# Patient Record
Sex: MALE | Race: WHITE | Employment: FULL TIME | URBAN - METROPOLITAN AREA
[De-identification: names, ages, dates, MRNs, and addresses within clinical notes are randomized per-mention and may not be internally consistent; named-entity substitution may affect disease eponyms.]

---

## 2020-04-30 ENCOUNTER — APPOINTMENT (OUTPATIENT)
Dept: CT IMAGING | Age: 40
DRG: 263 | End: 2020-04-30
Payer: COMMERCIAL

## 2020-04-30 ENCOUNTER — APPOINTMENT (OUTPATIENT)
Dept: GENERAL RADIOLOGY | Age: 40
DRG: 263 | End: 2020-04-30
Payer: COMMERCIAL

## 2020-04-30 ENCOUNTER — HOSPITAL ENCOUNTER (INPATIENT)
Age: 40
LOS: 5 days | Discharge: HOME | DRG: 263 | End: 2020-05-05
Attending: EMERGENCY MEDICINE | Admitting: SURGERY
Payer: COMMERCIAL

## 2020-04-30 ENCOUNTER — APPOINTMENT (OUTPATIENT)
Dept: ULTRASOUND IMAGING | Age: 40
DRG: 263 | End: 2020-04-30
Payer: COMMERCIAL

## 2020-04-30 PROBLEM — K80.64 CHRONIC CHOLECYSTITIS DUE TO CHOLELITHIASIS WITH CHOLEDOCHOLITHIASIS: Status: ACTIVE | Noted: 2020-04-30

## 2020-04-30 LAB
ALBUMIN SERPL-MCNC: 4.3 G/DL (ref 3.5–5.2)
ALP BLD-CCNC: 144 U/L (ref 40–129)
ALT SERPL-CCNC: 329 U/L (ref 0–40)
ANION GAP SERPL CALCULATED.3IONS-SCNC: 13 MMOL/L (ref 7–16)
AST SERPL-CCNC: 113 U/L (ref 0–39)
BASOPHILS ABSOLUTE: 0.02 E9/L (ref 0–0.2)
BASOPHILS RELATIVE PERCENT: 0.2 % (ref 0–2)
BILIRUB SERPL-MCNC: 1.3 MG/DL (ref 0–1.2)
BILIRUBIN URINE: ABNORMAL
BLOOD, URINE: NEGATIVE
BUN BLDV-MCNC: 15 MG/DL (ref 6–20)
CALCIUM SERPL-MCNC: 9.6 MG/DL (ref 8.6–10.2)
CHLORIDE BLD-SCNC: 104 MMOL/L (ref 98–107)
CLARITY: CLEAR
CO2: 21 MMOL/L (ref 22–29)
COLOR: YELLOW
CREAT SERPL-MCNC: 0.8 MG/DL (ref 0.7–1.2)
EOSINOPHILS ABSOLUTE: 0.35 E9/L (ref 0.05–0.5)
EOSINOPHILS RELATIVE PERCENT: 3 % (ref 0–6)
GFR AFRICAN AMERICAN: >60
GFR NON-AFRICAN AMERICAN: >60 ML/MIN/1.73
GLUCOSE BLD-MCNC: 108 MG/DL (ref 74–99)
GLUCOSE URINE: NEGATIVE MG/DL
HCT VFR BLD CALC: 43.1 % (ref 37–54)
HEMOGLOBIN: 14.2 G/DL (ref 12.5–16.5)
IMMATURE GRANULOCYTES #: 0.04 E9/L
IMMATURE GRANULOCYTES %: 0.3 % (ref 0–5)
KETONES, URINE: NEGATIVE MG/DL
LACTIC ACID: 1.8 MMOL/L (ref 0.5–2.2)
LEUKOCYTE ESTERASE, URINE: NEGATIVE
LIPASE: 16 U/L (ref 13–60)
LYMPHOCYTES ABSOLUTE: 3.55 E9/L (ref 1.5–4)
LYMPHOCYTES RELATIVE PERCENT: 30.8 % (ref 20–42)
MCH RBC QN AUTO: 29.7 PG (ref 26–35)
MCHC RBC AUTO-ENTMCNC: 32.9 % (ref 32–34.5)
MCV RBC AUTO: 90.2 FL (ref 80–99.9)
MONOCYTES ABSOLUTE: 0.99 E9/L (ref 0.1–0.95)
MONOCYTES RELATIVE PERCENT: 8.6 % (ref 2–12)
NEUTROPHILS ABSOLUTE: 6.56 E9/L (ref 1.8–7.3)
NEUTROPHILS RELATIVE PERCENT: 57.1 % (ref 43–80)
NITRITE, URINE: NEGATIVE
PDW BLD-RTO: 14.5 FL (ref 11.5–15)
PH UA: 5.5 (ref 5–9)
PLATELET # BLD: 299 E9/L (ref 130–450)
PMV BLD AUTO: 11.1 FL (ref 7–12)
POTASSIUM REFLEX MAGNESIUM: 3.8 MMOL/L (ref 3.5–5)
PROTEIN UA: NEGATIVE MG/DL
RBC # BLD: 4.78 E12/L (ref 3.8–5.8)
SODIUM BLD-SCNC: 138 MMOL/L (ref 132–146)
SPECIFIC GRAVITY UA: >=1.03 (ref 1–1.03)
TOTAL PROTEIN: 7.4 G/DL (ref 6.4–8.3)
TROPONIN: <0.01 NG/ML (ref 0–0.03)
UROBILINOGEN, URINE: 4 E.U./DL
WBC # BLD: 11.5 E9/L (ref 4.5–11.5)

## 2020-04-30 PROCEDURE — 2580000003 HC RX 258: Performed by: TRANSPLANT SURGERY

## 2020-04-30 PROCEDURE — 83690 ASSAY OF LIPASE: CPT

## 2020-04-30 PROCEDURE — 84484 ASSAY OF TROPONIN QUANT: CPT

## 2020-04-30 PROCEDURE — 96375 TX/PRO/DX INJ NEW DRUG ADDON: CPT

## 2020-04-30 PROCEDURE — 2500000003 HC RX 250 WO HCPCS: Performed by: EMERGENCY MEDICINE

## 2020-04-30 PROCEDURE — 1200000000 HC SEMI PRIVATE

## 2020-04-30 PROCEDURE — 6360000002 HC RX W HCPCS: Performed by: EMERGENCY MEDICINE

## 2020-04-30 PROCEDURE — 99285 EMERGENCY DEPT VISIT HI MDM: CPT

## 2020-04-30 PROCEDURE — 6360000002 HC RX W HCPCS: Performed by: TRANSPLANT SURGERY

## 2020-04-30 PROCEDURE — 76705 ECHO EXAM OF ABDOMEN: CPT

## 2020-04-30 PROCEDURE — 71045 X-RAY EXAM CHEST 1 VIEW: CPT

## 2020-04-30 PROCEDURE — 96374 THER/PROPH/DIAG INJ IV PUSH: CPT

## 2020-04-30 PROCEDURE — 81003 URINALYSIS AUTO W/O SCOPE: CPT

## 2020-04-30 PROCEDURE — 74177 CT ABD & PELVIS W/CONTRAST: CPT

## 2020-04-30 PROCEDURE — 6370000000 HC RX 637 (ALT 250 FOR IP): Performed by: TRANSPLANT SURGERY

## 2020-04-30 PROCEDURE — 93005 ELECTROCARDIOGRAM TRACING: CPT | Performed by: EMERGENCY MEDICINE

## 2020-04-30 PROCEDURE — 2500000003 HC RX 250 WO HCPCS: Performed by: TRANSPLANT SURGERY

## 2020-04-30 PROCEDURE — 85025 COMPLETE CBC W/AUTO DIFF WBC: CPT

## 2020-04-30 PROCEDURE — 0FC98ZZ EXTIRPATION OF MATTER FROM COMMON BILE DUCT, VIA NATURAL OR ARTIFICIAL OPENING ENDOSCOPIC: ICD-10-PCS | Performed by: INTERNAL MEDICINE

## 2020-04-30 PROCEDURE — 83605 ASSAY OF LACTIC ACID: CPT

## 2020-04-30 PROCEDURE — 80053 COMPREHEN METABOLIC PANEL: CPT

## 2020-04-30 PROCEDURE — 6360000004 HC RX CONTRAST MEDICATION: Performed by: RADIOLOGY

## 2020-04-30 RX ORDER — CIPROFLOXACIN 2 MG/ML
400 INJECTION, SOLUTION INTRAVENOUS EVERY 12 HOURS
Status: DISCONTINUED | OUTPATIENT
Start: 2020-05-01 | End: 2020-05-05 | Stop reason: HOSPADM

## 2020-04-30 RX ORDER — SODIUM CHLORIDE 0.9 % (FLUSH) 0.9 %
10 SYRINGE (ML) INJECTION PRN
Status: DISCONTINUED | OUTPATIENT
Start: 2020-04-30 | End: 2020-05-05 | Stop reason: HOSPADM

## 2020-04-30 RX ORDER — ONDANSETRON 2 MG/ML
8 INJECTION INTRAMUSCULAR; INTRAVENOUS ONCE
Status: COMPLETED | OUTPATIENT
Start: 2020-04-30 | End: 2020-04-30

## 2020-04-30 RX ORDER — KETOROLAC TROMETHAMINE 30 MG/ML
15 INJECTION, SOLUTION INTRAMUSCULAR; INTRAVENOUS ONCE
Status: COMPLETED | OUTPATIENT
Start: 2020-04-30 | End: 2020-04-30

## 2020-04-30 RX ORDER — SODIUM CHLORIDE, SODIUM LACTATE, POTASSIUM CHLORIDE, CALCIUM CHLORIDE 600; 310; 30; 20 MG/100ML; MG/100ML; MG/100ML; MG/100ML
INJECTION, SOLUTION INTRAVENOUS CONTINUOUS
Status: DISCONTINUED | OUTPATIENT
Start: 2020-04-30 | End: 2020-05-05

## 2020-04-30 RX ORDER — SODIUM CHLORIDE 0.9 % (FLUSH) 0.9 %
10 SYRINGE (ML) INJECTION EVERY 12 HOURS SCHEDULED
Status: DISCONTINUED | OUTPATIENT
Start: 2020-04-30 | End: 2020-05-05 | Stop reason: HOSPADM

## 2020-04-30 RX ORDER — ONDANSETRON 2 MG/ML
4 INJECTION INTRAMUSCULAR; INTRAVENOUS EVERY 6 HOURS PRN
Status: DISCONTINUED | OUTPATIENT
Start: 2020-04-30 | End: 2020-05-05 | Stop reason: HOSPADM

## 2020-04-30 RX ORDER — MORPHINE SULFATE 4 MG/ML
8 INJECTION, SOLUTION INTRAMUSCULAR; INTRAVENOUS ONCE
Status: COMPLETED | OUTPATIENT
Start: 2020-04-30 | End: 2020-04-30

## 2020-04-30 RX ORDER — POTASSIUM CHLORIDE 20 MEQ/1
40 TABLET, EXTENDED RELEASE ORAL PRN
Status: DISCONTINUED | OUTPATIENT
Start: 2020-04-30 | End: 2020-05-05 | Stop reason: HOSPADM

## 2020-04-30 RX ORDER — ACETAMINOPHEN 325 MG/1
650 TABLET ORAL EVERY 6 HOURS
Status: DISCONTINUED | OUTPATIENT
Start: 2020-04-30 | End: 2020-05-05 | Stop reason: HOSPADM

## 2020-04-30 RX ORDER — KETOROLAC TROMETHAMINE 30 MG/ML
15 INJECTION, SOLUTION INTRAMUSCULAR; INTRAVENOUS EVERY 6 HOURS
Status: DISCONTINUED | OUTPATIENT
Start: 2020-04-30 | End: 2020-05-05 | Stop reason: HOSPADM

## 2020-04-30 RX ORDER — POTASSIUM CHLORIDE 7.45 MG/ML
10 INJECTION INTRAVENOUS PRN
Status: DISCONTINUED | OUTPATIENT
Start: 2020-04-30 | End: 2020-05-05 | Stop reason: HOSPADM

## 2020-04-30 RX ADMIN — FAMOTIDINE 20 MG: 10 INJECTION INTRAVENOUS at 17:05

## 2020-04-30 RX ADMIN — SODIUM CHLORIDE, POTASSIUM CHLORIDE, SODIUM LACTATE AND CALCIUM CHLORIDE: 600; 310; 30; 20 INJECTION, SOLUTION INTRAVENOUS at 21:32

## 2020-04-30 RX ADMIN — ACETAMINOPHEN 650 MG: 325 TABLET ORAL at 21:48

## 2020-04-30 RX ADMIN — FAMOTIDINE 20 MG: 10 INJECTION INTRAVENOUS at 21:47

## 2020-04-30 RX ADMIN — KETOROLAC TROMETHAMINE 15 MG: 30 INJECTION, SOLUTION INTRAMUSCULAR; INTRAVENOUS at 23:11

## 2020-04-30 RX ADMIN — HYDROMORPHONE HYDROCHLORIDE 0.5 MG: 1 INJECTION, SOLUTION INTRAMUSCULAR; INTRAVENOUS; SUBCUTANEOUS at 21:11

## 2020-04-30 RX ADMIN — MORPHINE SULFATE 8 MG: 4 INJECTION, SOLUTION INTRAMUSCULAR; INTRAVENOUS at 14:42

## 2020-04-30 RX ADMIN — IOPAMIDOL 110 ML: 755 INJECTION, SOLUTION INTRAVENOUS at 15:30

## 2020-04-30 RX ADMIN — METRONIDAZOLE 500 MG: 500 INJECTION, SOLUTION INTRAVENOUS at 21:48

## 2020-04-30 RX ADMIN — ONDANSETRON 8 MG: 2 INJECTION INTRAMUSCULAR; INTRAVENOUS at 14:42

## 2020-04-30 RX ADMIN — HYDROMORPHONE HYDROCHLORIDE 0.5 MG: 1 INJECTION, SOLUTION INTRAMUSCULAR; INTRAVENOUS; SUBCUTANEOUS at 17:06

## 2020-04-30 RX ADMIN — CIPROFLOXACIN 400 MG: 2 INJECTION, SOLUTION INTRAVENOUS at 23:12

## 2020-04-30 RX ADMIN — KETOROLAC TROMETHAMINE 15 MG: 30 INJECTION, SOLUTION INTRAMUSCULAR at 17:06

## 2020-04-30 ASSESSMENT — PAIN DESCRIPTION - PAIN TYPE
TYPE: ACUTE PAIN

## 2020-04-30 ASSESSMENT — ENCOUNTER SYMPTOMS
TROUBLE SWALLOWING: 0
HEMATOCHEZIA: 0
VOMITING: 0
COUGH: 0
ABDOMINAL PAIN: 1
CONSTIPATION: 0
ANAL BLEEDING: 0
SORE THROAT: 0
BLOOD IN STOOL: 0
CHEST TIGHTNESS: 0
COLOR CHANGE: 0
DIARRHEA: 0
EYE REDNESS: 0
EYE PAIN: 0
NAUSEA: 0
ABDOMINAL DISTENTION: 0
SHORTNESS OF BREATH: 0
HEMATEMESIS: 0

## 2020-04-30 ASSESSMENT — PAIN DESCRIPTION - DESCRIPTORS: DESCRIPTORS: SHARP;STABBING;SQUEEZING

## 2020-04-30 ASSESSMENT — PAIN SCALES - GENERAL
PAINLEVEL_OUTOF10: 1
PAINLEVEL_OUTOF10: 7
PAINLEVEL_OUTOF10: 10
PAINLEVEL_OUTOF10: 3

## 2020-04-30 ASSESSMENT — PAIN DESCRIPTION - LOCATION
LOCATION: BACK
LOCATION: CHEST
LOCATION: BACK

## 2020-04-30 ASSESSMENT — PAIN DESCRIPTION - FREQUENCY
FREQUENCY: INTERMITTENT
FREQUENCY: CONTINUOUS

## 2020-04-30 NOTE — ED PROVIDER NOTES
Patient is a 36year-old Male, with no significant past medical history, who presents via private vehicle from home for abdominal pain. The patient reports he has had chronic recurring episodes of sharp epigastric pain which come and go sporadically. He reports he has not had any issues with it about a year up until yesterday and had a recurrence that lasted for a few hours and spontaneously subsided. His pain returned today around 10:00 and has been persistent since. He describes it as sharp and stabbing and severe radiating straight through into his back. Nothing seems to make it better or worse. He has taken nothing for alleviation. He denies associated fevers or chills or sweats or nausea or vomiting or diarrhea. He reports being worked up for this pain in the past including with an EGD which no cause was identified. He reports that he follows with gastroenterology at [facility]. Denies alcohol use or drug use or any diet or medication changes. The history is provided by the patient.    Abdominal Pain   Pain location:  Epigastric  Pain quality: sharp and stabbing    Pain radiates to:  Back  Pain severity:  Severe  Onset quality:  Sudden  Timing:  Constant  Progression:  Unchanged  Chronicity:  Recurrent  Context: not alcohol use, not awakening from sleep, not diet changes, not eating, not laxative use, not medication withdrawal, not previous surgeries, not sick contacts, not suspicious food intake and not trauma    Relieved by:  Nothing  Worsened by:  Nothing  Ineffective treatments:  None tried  Associated symptoms: no anorexia, no chest pain, no chills, no constipation, no cough, no diarrhea, no dysuria, no fever, no hematemesis, no hematochezia, no hematuria, no melena, no nausea, no shortness of breath, no sore throat and no vomiting    Risk factors: obesity    Risk factors: no alcohol abuse, no aspirin use, not elderly, has not had multiple surgeries, no NSAID use and no recent recurrence for the past several years but with increased frequency over the past 2 days. He is found to have evidence of choledocholithiasis possible cholecystitis developing on CT scan, lab work. General surgery consulted they decided to admit the patient. Antibiotics ordered by surgical service.    --------------------------------------------- PAST HISTORY ---------------------------------------------  Past Medical History:  has a past medical history of Asthma. Past Surgical History:  has a past surgical history that includes Tonsillectomy. Social History:  reports that he has been smoking. He has never used smokeless tobacco. He reports that he does not drink alcohol or use drugs. Family History: family history is not on file. The patients home medications have been reviewed.     Allergies: Cefaclor    -------------------------------------------------- RESULTS -------------------------------------------------    LABS:  Results for orders placed or performed during the hospital encounter of 04/30/20   CBC Auto Differential   Result Value Ref Range    WBC 11.5 4.5 - 11.5 E9/L    RBC 4.78 3.80 - 5.80 E12/L    Hemoglobin 14.2 12.5 - 16.5 g/dL    Hematocrit 43.1 37.0 - 54.0 %    MCV 90.2 80.0 - 99.9 fL    MCH 29.7 26.0 - 35.0 pg    MCHC 32.9 32.0 - 34.5 %    RDW 14.5 11.5 - 15.0 fL    Platelets 652 352 - 320 E9/L    MPV 11.1 7.0 - 12.0 fL    Neutrophils % 57.1 43.0 - 80.0 %    Immature Granulocytes % 0.3 0.0 - 5.0 %    Lymphocytes % 30.8 20.0 - 42.0 %    Monocytes % 8.6 2.0 - 12.0 %    Eosinophils % 3.0 0.0 - 6.0 %    Basophils % 0.2 0.0 - 2.0 %    Neutrophils Absolute 6.56 1.80 - 7.30 E9/L    Immature Granulocytes # 0.04 E9/L    Lymphocytes Absolute 3.55 1.50 - 4.00 E9/L    Monocytes Absolute 0.99 (H) 0.10 - 0.95 E9/L    Eosinophils Absolute 0.35 0.05 - 0.50 E9/L    Basophils Absolute 0.02 0.00 - 0.20 E9/L   Comprehensive Metabolic Panel w/ Reflex to MG   Result Value Ref Range    Sodium 138 132 - 146 mmol/L    Potassium reflex Magnesium 3.8 3.5 - 5.0 mmol/L    Chloride 104 98 - 107 mmol/L    CO2 21 (L) 22 - 29 mmol/L    Anion Gap 13 7 - 16 mmol/L    Glucose 108 (H) 74 - 99 mg/dL    BUN 15 6 - 20 mg/dL    CREATININE 0.8 0.7 - 1.2 mg/dL    GFR Non-African American >60 >=60 mL/min/1.73    GFR African American >60     Calcium 9.6 8.6 - 10.2 mg/dL    Total Protein 7.4 6.4 - 8.3 g/dL    Alb 4.3 3.5 - 5.2 g/dL    Total Bilirubin 1.3 (H) 0.0 - 1.2 mg/dL    Alkaline Phosphatase 144 (H) 40 - 129 U/L     (H) 0 - 40 U/L     (H) 0 - 39 U/L   Lipase   Result Value Ref Range    Lipase 16 13 - 60 U/L   Troponin   Result Value Ref Range    Troponin <0.01 0.00 - 0.03 ng/mL   Lactic Acid, Plasma   Result Value Ref Range    Lactic Acid 1.8 0.5 - 2.2 mmol/L   Urinalysis, reflex to microscopic   Result Value Ref Range    Color, UA Yellow Straw/Yellow    Clarity, UA Clear Clear    Glucose, Ur Negative Negative mg/dL    Bilirubin Urine MODERATE (A) Negative    Ketones, Urine Negative Negative mg/dL    Specific Gravity, UA >=1.030 1.005 - 1.030    Blood, Urine Negative Negative    pH, UA 5.5 5.0 - 9.0    Protein, UA Negative Negative mg/dL    Urobilinogen, Urine 4.0 (A) <2.0 E.U./dL    Nitrite, Urine Negative Negative    Leukocyte Esterase, Urine Negative Negative   EKG 12 Lead   Result Value Ref Range    Ventricular Rate 55 BPM    Atrial Rate 55 BPM    P-R Interval 122 ms    QRS Duration 96 ms    Q-T Interval 436 ms    QTc Calculation (Bazett) 417 ms    P Axis 18 degrees    R Axis 39 degrees    T Axis 22 degrees       RADIOLOGY:  US ABDOMEN LIMITED   Final Result      Cholelithiasis with gallbladder wall thickening and slight dilation of   the common bile duct. Hepatic steatosis. CT ABDOMEN PELVIS W IV CONTRAST Additional Contrast? None   Final Result      There is no evidence to suggest acute process on CT of abdomen and   pelvis. Hepatic steatosis.       Bilateral pars defects L5-S1 with grade 1

## 2020-05-01 ENCOUNTER — APPOINTMENT (OUTPATIENT)
Dept: GENERAL RADIOLOGY | Age: 40
DRG: 263 | End: 2020-05-01
Payer: COMMERCIAL

## 2020-05-01 ENCOUNTER — APPOINTMENT (OUTPATIENT)
Dept: MRI IMAGING | Age: 40
DRG: 263 | End: 2020-05-01
Payer: COMMERCIAL

## 2020-05-01 ENCOUNTER — ANESTHESIA EVENT (OUTPATIENT)
Dept: ENDOSCOPY | Age: 40
DRG: 263 | End: 2020-05-01
Payer: COMMERCIAL

## 2020-05-01 ENCOUNTER — ANESTHESIA (OUTPATIENT)
Dept: ENDOSCOPY | Age: 40
DRG: 263 | End: 2020-05-01
Payer: COMMERCIAL

## 2020-05-01 VITALS — OXYGEN SATURATION: 93 % | SYSTOLIC BLOOD PRESSURE: 142 MMHG | DIASTOLIC BLOOD PRESSURE: 79 MMHG

## 2020-05-01 LAB
ALBUMIN SERPL-MCNC: 3.9 G/DL (ref 3.5–5.2)
ALP BLD-CCNC: 138 U/L (ref 40–129)
ALT SERPL-CCNC: 359 U/L (ref 0–40)
ANION GAP SERPL CALCULATED.3IONS-SCNC: 11 MMOL/L (ref 7–16)
APTT: 31.7 SEC (ref 24.5–35.1)
AST SERPL-CCNC: 160 U/L (ref 0–39)
BASOPHILS ABSOLUTE: 0.02 E9/L (ref 0–0.2)
BASOPHILS RELATIVE PERCENT: 0.2 % (ref 0–2)
BILIRUB SERPL-MCNC: 3.3 MG/DL (ref 0–1.2)
BUN BLDV-MCNC: 9 MG/DL (ref 6–20)
CALCIUM SERPL-MCNC: 9.3 MG/DL (ref 8.6–10.2)
CHLORIDE BLD-SCNC: 104 MMOL/L (ref 98–107)
CO2: 22 MMOL/L (ref 22–29)
CREAT SERPL-MCNC: 0.7 MG/DL (ref 0.7–1.2)
EKG ATRIAL RATE: 55 BPM
EKG P AXIS: 18 DEGREES
EKG P-R INTERVAL: 122 MS
EKG Q-T INTERVAL: 436 MS
EKG QRS DURATION: 96 MS
EKG QTC CALCULATION (BAZETT): 417 MS
EKG R AXIS: 39 DEGREES
EKG T AXIS: 22 DEGREES
EKG VENTRICULAR RATE: 55 BPM
EOSINOPHILS ABSOLUTE: 0.18 E9/L (ref 0.05–0.5)
EOSINOPHILS RELATIVE PERCENT: 1.4 % (ref 0–6)
GFR AFRICAN AMERICAN: >60
GFR NON-AFRICAN AMERICAN: >60 ML/MIN/1.73
GLUCOSE BLD-MCNC: 130 MG/DL (ref 74–99)
HCT VFR BLD CALC: 40.1 % (ref 37–54)
HEMOGLOBIN: 13.3 G/DL (ref 12.5–16.5)
IMMATURE GRANULOCYTES #: 0.05 E9/L
IMMATURE GRANULOCYTES %: 0.4 % (ref 0–5)
INR BLD: 0.9
LIPASE: >3000 U/L (ref 13–60)
LYMPHOCYTES ABSOLUTE: 2.64 E9/L (ref 1.5–4)
LYMPHOCYTES RELATIVE PERCENT: 20 % (ref 20–42)
MCH RBC QN AUTO: 30.2 PG (ref 26–35)
MCHC RBC AUTO-ENTMCNC: 33.2 % (ref 32–34.5)
MCV RBC AUTO: 91.1 FL (ref 80–99.9)
MONOCYTES ABSOLUTE: 1.37 E9/L (ref 0.1–0.95)
MONOCYTES RELATIVE PERCENT: 10.4 % (ref 2–12)
NEUTROPHILS ABSOLUTE: 8.97 E9/L (ref 1.8–7.3)
NEUTROPHILS RELATIVE PERCENT: 67.6 % (ref 43–80)
PDW BLD-RTO: 14.7 FL (ref 11.5–15)
PLATELET # BLD: 248 E9/L (ref 130–450)
PMV BLD AUTO: 11.1 FL (ref 7–12)
POTASSIUM REFLEX MAGNESIUM: 4.2 MMOL/L (ref 3.5–5)
PROTHROMBIN TIME: 10.3 SEC (ref 9.3–12.4)
RBC # BLD: 4.4 E12/L (ref 3.8–5.8)
SODIUM BLD-SCNC: 137 MMOL/L (ref 132–146)
TOTAL PROTEIN: 6.8 G/DL (ref 6.4–8.3)
WBC # BLD: 13.2 E9/L (ref 4.5–11.5)

## 2020-05-01 PROCEDURE — 6370000000 HC RX 637 (ALT 250 FOR IP): Performed by: TRANSPLANT SURGERY

## 2020-05-01 PROCEDURE — 2709999900 HC NON-CHARGEABLE SUPPLY: Performed by: INTERNAL MEDICINE

## 2020-05-01 PROCEDURE — 74330 X-RAY BILE/PANC ENDOSCOPY: CPT

## 2020-05-01 PROCEDURE — 2580000003 HC RX 258: Performed by: NURSE PRACTITIONER

## 2020-05-01 PROCEDURE — 6360000002 HC RX W HCPCS: Performed by: TRANSPLANT SURGERY

## 2020-05-01 PROCEDURE — 6360000004 HC RX CONTRAST MEDICATION: Performed by: RADIOLOGY

## 2020-05-01 PROCEDURE — 3700000001 HC ADD 15 MINUTES (ANESTHESIA): Performed by: INTERNAL MEDICINE

## 2020-05-01 PROCEDURE — 74183 MRI ABD W/O CNTR FLWD CNTR: CPT

## 2020-05-01 PROCEDURE — 93010 ELECTROCARDIOGRAM REPORT: CPT | Performed by: INTERNAL MEDICINE

## 2020-05-01 PROCEDURE — 7100000010 HC PHASE II RECOVERY - FIRST 15 MIN: Performed by: INTERNAL MEDICINE

## 2020-05-01 PROCEDURE — 6360000002 HC RX W HCPCS

## 2020-05-01 PROCEDURE — 2500000003 HC RX 250 WO HCPCS

## 2020-05-01 PROCEDURE — 2500000003 HC RX 250 WO HCPCS: Performed by: NURSE ANESTHETIST, CERTIFIED REGISTERED

## 2020-05-01 PROCEDURE — 3609015200 HC ERCP REMOVE CALCULI/DEBRIS BILIARY/PANCREAS DUCT: Performed by: INTERNAL MEDICINE

## 2020-05-01 PROCEDURE — 85025 COMPLETE CBC W/AUTO DIFF WBC: CPT

## 2020-05-01 PROCEDURE — 2580000003 HC RX 258: Performed by: INTERNAL MEDICINE

## 2020-05-01 PROCEDURE — 0FT44ZZ RESECTION OF GALLBLADDER, PERCUTANEOUS ENDOSCOPIC APPROACH: ICD-10-PCS | Performed by: SURGERY

## 2020-05-01 PROCEDURE — 36415 COLL VENOUS BLD VENIPUNCTURE: CPT

## 2020-05-01 PROCEDURE — 2580000003 HC RX 258: Performed by: TRANSPLANT SURGERY

## 2020-05-01 PROCEDURE — 2500000003 HC RX 250 WO HCPCS: Performed by: TRANSPLANT SURGERY

## 2020-05-01 PROCEDURE — 2580000003 HC RX 258: Performed by: NURSE ANESTHETIST, CERTIFIED REGISTERED

## 2020-05-01 PROCEDURE — 7100000011 HC PHASE II RECOVERY - ADDTL 15 MIN: Performed by: INTERNAL MEDICINE

## 2020-05-01 PROCEDURE — 85610 PROTHROMBIN TIME: CPT

## 2020-05-01 PROCEDURE — C1769 GUIDE WIRE: HCPCS | Performed by: INTERNAL MEDICINE

## 2020-05-01 PROCEDURE — BF131ZZ FLUOROSCOPY OF GALLBLADDER AND BILE DUCTS USING LOW OSMOLAR CONTRAST: ICD-10-PCS | Performed by: SURGERY

## 2020-05-01 PROCEDURE — 80053 COMPREHEN METABOLIC PANEL: CPT

## 2020-05-01 PROCEDURE — 6360000002 HC RX W HCPCS: Performed by: NURSE ANESTHETIST, CERTIFIED REGISTERED

## 2020-05-01 PROCEDURE — 99223 1ST HOSP IP/OBS HIGH 75: CPT | Performed by: SURGERY

## 2020-05-01 PROCEDURE — 83690 ASSAY OF LIPASE: CPT

## 2020-05-01 PROCEDURE — 3700000000 HC ANESTHESIA ATTENDED CARE: Performed by: INTERNAL MEDICINE

## 2020-05-01 PROCEDURE — 85730 THROMBOPLASTIN TIME PARTIAL: CPT

## 2020-05-01 PROCEDURE — 2720000010 HC SURG SUPPLY STERILE: Performed by: INTERNAL MEDICINE

## 2020-05-01 PROCEDURE — 1200000000 HC SEMI PRIVATE

## 2020-05-01 PROCEDURE — A9579 GAD-BASE MR CONTRAST NOS,1ML: HCPCS | Performed by: RADIOLOGY

## 2020-05-01 PROCEDURE — 6370000000 HC RX 637 (ALT 250 FOR IP): Performed by: NURSE PRACTITIONER

## 2020-05-01 RX ORDER — FENTANYL CITRATE 50 UG/ML
INJECTION, SOLUTION INTRAMUSCULAR; INTRAVENOUS PRN
Status: DISCONTINUED | OUTPATIENT
Start: 2020-05-01 | End: 2020-05-01 | Stop reason: SDUPTHER

## 2020-05-01 RX ORDER — SODIUM CHLORIDE, SODIUM LACTATE, POTASSIUM CHLORIDE, CALCIUM CHLORIDE 600; 310; 30; 20 MG/100ML; MG/100ML; MG/100ML; MG/100ML
1000 INJECTION, SOLUTION INTRAVENOUS CONTINUOUS
Status: ACTIVE | OUTPATIENT
Start: 2020-05-01 | End: 2020-05-01

## 2020-05-01 RX ORDER — SODIUM CHLORIDE, SODIUM LACTATE, POTASSIUM CHLORIDE, AND CALCIUM CHLORIDE .6; .31; .03; .02 G/100ML; G/100ML; G/100ML; G/100ML
1000 INJECTION, SOLUTION INTRAVENOUS ONCE
Status: COMPLETED | OUTPATIENT
Start: 2020-05-01 | End: 2020-05-01

## 2020-05-01 RX ORDER — PROPOFOL 10 MG/ML
INJECTION, EMULSION INTRAVENOUS PRN
Status: DISCONTINUED | OUTPATIENT
Start: 2020-05-01 | End: 2020-05-01 | Stop reason: SDUPTHER

## 2020-05-01 RX ORDER — MIDAZOLAM HYDROCHLORIDE 1 MG/ML
INJECTION INTRAMUSCULAR; INTRAVENOUS PRN
Status: DISCONTINUED | OUTPATIENT
Start: 2020-05-01 | End: 2020-05-01 | Stop reason: SDUPTHER

## 2020-05-01 RX ORDER — SODIUM CHLORIDE, SODIUM LACTATE, POTASSIUM CHLORIDE, CALCIUM CHLORIDE 600; 310; 30; 20 MG/100ML; MG/100ML; MG/100ML; MG/100ML
INJECTION, SOLUTION INTRAVENOUS CONTINUOUS PRN
Status: DISCONTINUED | OUTPATIENT
Start: 2020-05-01 | End: 2020-05-01 | Stop reason: SDUPTHER

## 2020-05-01 RX ORDER — LIDOCAINE HYDROCHLORIDE 20 MG/ML
INJECTION, SOLUTION EPIDURAL; INFILTRATION; INTRACAUDAL; PERINEURAL PRN
Status: DISCONTINUED | OUTPATIENT
Start: 2020-05-01 | End: 2020-05-01 | Stop reason: SDUPTHER

## 2020-05-01 RX ADMIN — KETOROLAC TROMETHAMINE 15 MG: 30 INJECTION, SOLUTION INTRAMUSCULAR; INTRAVENOUS at 09:50

## 2020-05-01 RX ADMIN — Medication 10 ML: at 20:27

## 2020-05-01 RX ADMIN — FENTANYL CITRATE 50 MCG: 50 INJECTION, SOLUTION INTRAMUSCULAR; INTRAVENOUS at 13:13

## 2020-05-01 RX ADMIN — PROPOFOL 50 MG: 10 INJECTION, EMULSION INTRAVENOUS at 13:17

## 2020-05-01 RX ADMIN — SODIUM CHLORIDE, POTASSIUM CHLORIDE, SODIUM LACTATE AND CALCIUM CHLORIDE 1000 ML: 600; 310; 30; 20 INJECTION, SOLUTION INTRAVENOUS at 14:30

## 2020-05-01 RX ADMIN — INDOMETHACIN 100 MG: 50 SUPPOSITORY RECTAL at 11:34

## 2020-05-01 RX ADMIN — GADOTERIDOL 20 ML: 279.3 INJECTION, SOLUTION INTRAVENOUS at 09:14

## 2020-05-01 RX ADMIN — FAMOTIDINE 20 MG: 10 INJECTION INTRAVENOUS at 20:25

## 2020-05-01 RX ADMIN — KETOROLAC TROMETHAMINE 15 MG: 30 INJECTION, SOLUTION INTRAMUSCULAR; INTRAVENOUS at 16:16

## 2020-05-01 RX ADMIN — ACETAMINOPHEN 650 MG: 325 TABLET ORAL at 03:58

## 2020-05-01 RX ADMIN — HYDROMORPHONE HYDROCHLORIDE 0.5 MG: 1 INJECTION, SOLUTION INTRAMUSCULAR; INTRAVENOUS; SUBCUTANEOUS at 06:15

## 2020-05-01 RX ADMIN — PROPOFOL 50 MG: 10 INJECTION, EMULSION INTRAVENOUS at 13:20

## 2020-05-01 RX ADMIN — IOPAMIDOL 14 ML: 612 INJECTION, SOLUTION INTRAVENOUS at 13:35

## 2020-05-01 RX ADMIN — LIDOCAINE HYDROCHLORIDE 40 MG: 20 INJECTION, SOLUTION EPIDURAL; INFILTRATION; INTRACAUDAL; PERINEURAL at 13:09

## 2020-05-01 RX ADMIN — KETOROLAC TROMETHAMINE 15 MG: 30 INJECTION, SOLUTION INTRAMUSCULAR; INTRAVENOUS at 03:58

## 2020-05-01 RX ADMIN — PROPOFOL 30 MG: 10 INJECTION, EMULSION INTRAVENOUS at 13:11

## 2020-05-01 RX ADMIN — METRONIDAZOLE 500 MG: 500 INJECTION, SOLUTION INTRAVENOUS at 16:16

## 2020-05-01 RX ADMIN — ACETAMINOPHEN 650 MG: 325 TABLET ORAL at 16:16

## 2020-05-01 RX ADMIN — PROPOFOL 100 MG: 10 INJECTION, EMULSION INTRAVENOUS at 13:25

## 2020-05-01 RX ADMIN — KETOROLAC TROMETHAMINE 15 MG: 30 INJECTION, SOLUTION INTRAMUSCULAR; INTRAVENOUS at 21:34

## 2020-05-01 RX ADMIN — SODIUM CHLORIDE, POTASSIUM CHLORIDE, SODIUM LACTATE AND CALCIUM CHLORIDE: 600; 310; 30; 20 INJECTION, SOLUTION INTRAVENOUS at 18:30

## 2020-05-01 RX ADMIN — SODIUM CHLORIDE, POTASSIUM CHLORIDE, SODIUM LACTATE AND CALCIUM CHLORIDE: 600; 310; 30; 20 INJECTION, SOLUTION INTRAVENOUS at 06:10

## 2020-05-01 RX ADMIN — ACETAMINOPHEN 650 MG: 325 TABLET ORAL at 21:33

## 2020-05-01 RX ADMIN — GLUCAGON HYDROCHLORIDE 0.25 MG: KIT at 13:20

## 2020-05-01 RX ADMIN — SODIUM CHLORIDE, POTASSIUM CHLORIDE, SODIUM LACTATE AND CALCIUM CHLORIDE 1000 ML: 600; 310; 30; 20 INJECTION, SOLUTION INTRAVENOUS at 11:32

## 2020-05-01 RX ADMIN — FENTANYL CITRATE 50 MCG: 50 INJECTION, SOLUTION INTRAMUSCULAR; INTRAVENOUS at 13:09

## 2020-05-01 RX ADMIN — HYDROMORPHONE HYDROCHLORIDE 0.25 MG: 1 INJECTION, SOLUTION INTRAMUSCULAR; INTRAVENOUS; SUBCUTANEOUS at 02:31

## 2020-05-01 RX ADMIN — CIPROFLOXACIN 400 MG: 2 INJECTION, SOLUTION INTRAVENOUS at 15:00

## 2020-05-01 RX ADMIN — SODIUM CHLORIDE, POTASSIUM CHLORIDE, SODIUM LACTATE AND CALCIUM CHLORIDE: 600; 310; 30; 20 INJECTION, SOLUTION INTRAVENOUS at 12:49

## 2020-05-01 RX ADMIN — METRONIDAZOLE 500 MG: 500 INJECTION, SOLUTION INTRAVENOUS at 06:12

## 2020-05-01 RX ADMIN — MIDAZOLAM 2 MG: 1 INJECTION INTRAMUSCULAR; INTRAVENOUS at 13:09

## 2020-05-01 RX ADMIN — PROPOFOL 120 MG: 10 INJECTION, EMULSION INTRAVENOUS at 13:10

## 2020-05-01 RX ADMIN — PROPOFOL 50 MG: 10 INJECTION, EMULSION INTRAVENOUS at 13:13

## 2020-05-01 RX ADMIN — PROPOFOL 50 MG: 10 INJECTION, EMULSION INTRAVENOUS at 13:15

## 2020-05-01 ASSESSMENT — ENCOUNTER SYMPTOMS
SORE THROAT: 0
VOMITING: 1
WHEEZING: 0
PHOTOPHOBIA: 0
SHORTNESS OF BREATH: 0
NAUSEA: 1
BACK PAIN: 0
DIARRHEA: 0
COUGH: 0
CONSTIPATION: 0
ABDOMINAL PAIN: 1
BLOOD IN STOOL: 0
EYE REDNESS: 0

## 2020-05-01 ASSESSMENT — PAIN SCALES - GENERAL
PAINLEVEL_OUTOF10: 3
PAINLEVEL_OUTOF10: 3
PAINLEVEL_OUTOF10: 5
PAINLEVEL_OUTOF10: 6
PAINLEVEL_OUTOF10: 3
PAINLEVEL_OUTOF10: 7
PAINLEVEL_OUTOF10: 0
PAINLEVEL_OUTOF10: 10
PAINLEVEL_OUTOF10: 8
PAINLEVEL_OUTOF10: 5
PAINLEVEL_OUTOF10: 7

## 2020-05-01 ASSESSMENT — PAIN - FUNCTIONAL ASSESSMENT
PAIN_FUNCTIONAL_ASSESSMENT: ACTIVITIES ARE NOT PREVENTED

## 2020-05-01 ASSESSMENT — PAIN DESCRIPTION - LOCATION
LOCATION: ABDOMEN
LOCATION: ABDOMEN;BACK
LOCATION: ABDOMEN;BACK

## 2020-05-01 ASSESSMENT — PAIN DESCRIPTION - ONSET
ONSET: ON-GOING

## 2020-05-01 ASSESSMENT — PAIN DESCRIPTION - DESCRIPTORS
DESCRIPTORS: SHARP;STABBING
DESCRIPTORS: SHARP
DESCRIPTORS: SHARP;STABBING

## 2020-05-01 ASSESSMENT — PAIN DESCRIPTION - PAIN TYPE
TYPE: ACUTE PAIN

## 2020-05-01 ASSESSMENT — PAIN DESCRIPTION - FREQUENCY
FREQUENCY: INTERMITTENT
FREQUENCY: CONTINUOUS
FREQUENCY: CONTINUOUS

## 2020-05-01 ASSESSMENT — PAIN DESCRIPTION - PROGRESSION
CLINICAL_PROGRESSION: NOT CHANGED
CLINICAL_PROGRESSION: GRADUALLY IMPROVING
CLINICAL_PROGRESSION: GRADUALLY IMPROVING
CLINICAL_PROGRESSION: GRADUALLY WORSENING

## 2020-05-01 ASSESSMENT — PAIN DESCRIPTION - ORIENTATION
ORIENTATION: MID
ORIENTATION: LEFT

## 2020-05-01 ASSESSMENT — LIFESTYLE VARIABLES: SMOKING_STATUS: 1

## 2020-05-01 NOTE — ANESTHESIA PRE PROCEDURE
Department of Anesthesiology  Preprocedure Note       Name:  Jing Pulido   Age:  36 y.o.  :  1980                                          MRN:  [id number]         Date:  2020      Surgeon: Gita Rausch):  Cierra Renee MD    Procedure: ERCP ENDOSCOPIC RETROGRADE CHOLANGIOPANCREATOGRAPHY (N/A )    Medications prior to admission:   Prior to Admission medications    Not on File       Current medications:    Current Facility-Administered Medications   Medication Dose Route Frequency Provider Last Rate Last Dose    indomethacin (INDOCIN) 50 MG suppository 100 mg  100 mg Rectal See Admin Instructions Radhika A Sugar, APRN - CNP        lactated ringers bolus  1,000 mL Intravenous Once Radhika A Sugar, APRN -  mL/hr at 20 1132 1,000 mL at 20 1132    sodium chloride flush 0.9 % injection 10 mL  10 mL Intravenous 2 times per day Eddi Barakat III, MD        sodium chloride flush 0.9 % injection 10 mL  10 mL Intravenous PRN Eddi Barakat III, MD        enoxaparin (LOVENOX) injection 40 mg  40 mg Subcutaneous Daily Eddi Barakat III, MD        lactated ringers infusion   Intravenous Continuous Eddi Barakat III,  mL/hr at 20 0610      potassium chloride (KLOR-CON M) extended release tablet 40 mEq  40 mEq Oral PRN Eddi Barakat III, MD        Or    potassium bicarb-citric acid (EFFER-K) effervescent tablet 40 mEq  40 mEq Oral PRN Eddi Barakat III, MD        Or    potassium chloride 10 mEq/100 mL IVPB (Peripheral Line)  10 mEq Intravenous PRN Eddi Barakat III, MD        ciprofloxacin (CIPRO) IVPB 400 mg  400 mg Intravenous Q12H Eddi Barakat III, MD   Stopped at 20 0020    metronidazole (FLAGYL) 500 mg in NaCl 100 mL IVPB premix  500 mg Intravenous Q8H Eddi Barakat III, MD   Stopped at 20 0726    HYDROmorphone (DILAUDID) injection 0.25 mg  0.25 mg Intravenous Q3H PRN Kanu Novak III, MD   0.25 mg at 05/01/20 0231    Or    HYDROmorphone (DILAUDID) injection 0.5 mg  0.5 mg Intravenous Q3H PRN Derek Hopson III, MD   0.5 mg at 05/01/20 0615    acetaminophen (TYLENOL) tablet 650 mg  650 mg Oral Q6H Kanu Novak III, MD   650 mg at 05/01/20 0358    ondansetron (ZOFRAN) injection 4 mg  4 mg Intravenous Q6H PRN Derek Hopson III, MD        famotidine (PEPCID) injection 20 mg  20 mg Intravenous BID Kanu Novak III, MD   20 mg at 04/30/20 2147    ketorolac (TORADOL) injection 15 mg  15 mg Intravenous Q6H Kanu Novak III, MD   15 mg at 05/01/20 0950    HYDROmorphone (DILAUDID) injection 0.5 mg  0.5 mg Intravenous Once Bentley Agosto DO           Allergies: Allergies   Allergen Reactions    Cefaclor Hives       Problem List:    Patient Active Problem List   Diagnosis Code    Chronic cholecystitis due to cholelithiasis with choledocholithiasis K80.64       Past Medical History:        Diagnosis Date    Asthma        Past Surgical History:        Procedure Laterality Date    TONSILLECTOMY         Social History:    Social History     Tobacco Use    Smoking status: Current Every Day Smoker    Smokeless tobacco: Never Used   Substance Use Topics    Alcohol use:  No                                Ready to quit: Not Answered  Counseling given: Not Answered      Vital Signs (Current):   Vitals:    04/30/20 2114 04/30/20 2115 05/01/20 0812 05/01/20 0945   BP: (!) 168/86 (!) 154/97 (!) 157/88 (!) 163/91   Pulse: 77 (!) 49 82 82   Resp: 20 20 18    Temp: 97.5 °F (36.4 °C) 97.8 °F (36.6 °C) 98.6 °F (37 °C)    TempSrc: Oral Oral Oral    SpO2: 99% 94% 95%    Weight:       Height:                                                  BP Readings from Last 3 Encounters:   05/01/20 (!) 163/91   09/14/16 125/66       NPO Status:                                                   Date of last liquid consumption: 04/30/20                        Date of last solid food consumption: 04/30/20    BMI:   Wt Readings from Last 3 Encounters:   04/30/20 255 lb (115.7 kg)   09/14/16 215 lb (97.5 kg)     Body mass index is 35.57 kg/m². CBC:   Lab Results   Component Value Date    WBC 13.2 05/01/2020    RBC 4.40 05/01/2020    RBC 4.99 04/12/2017    HGB 13.3 05/01/2020    HCT 40.1 05/01/2020    MCV 91.1 05/01/2020    RDW 14.7 05/01/2020     05/01/2020       CMP:   Lab Results   Component Value Date     05/01/2020    K 4.2 05/01/2020     05/01/2020    CO2 22 05/01/2020    BUN 9 05/01/2020    CREATININE 0.7 05/01/2020    GFRAA >60 05/01/2020    LABGLOM >60 05/01/2020    GLUCOSE 130 05/01/2020    PROT 6.8 05/01/2020    CALCIUM 9.3 05/01/2020    BILITOT 3.3 05/01/2020    ALKPHOS 138 05/01/2020     05/01/2020     05/01/2020       POC Tests: No results for input(s): POCGLU, POCNA, POCK, POCCL, POCBUN, POCHEMO, POCHCT in the last 72 hours.     Coags: No results found for: PROTIME, INR, APTT    HCG (If Applicable): No results found for: PREGTESTUR, PREGSERUM, HCG, HCGQUANT     ABGs: No results found for: PHART, PO2ART, SGM6VVW, GLU7YDD, BEART, W8YOHHKP     Type & Screen (If Applicable):  No results found for: [facility]    Anesthesia Evaluation  Patient summary reviewed no history of anesthetic complications:   Airway: Mallampati: III  TM distance: >3 FB   Neck ROM: full  Mouth opening: > = 3 FB Dental:          Pulmonary: breath sounds clear to auscultation  (+) asthma: current smoker                          ROS comment: CXR 4/30/2020:  Limited study, due to a poor inspiratory effort, no gross  abnormality       Cardiovascular:  Exercise tolerance: good (>4 METS),         ECG reviewed  Rhythm: regular  Rate: normal                 ROS comment: EKG 4/30/2020:  Sinus bradycardia  Otherwise normal ECG  No previous ECGs available  Confirmed by Laura Davidson ([id number]) on 5/1/2020 8:15:33 AM     Neuro/Psych:

## 2020-05-01 NOTE — PATIENT CARE CONFERENCE
P Quality Flow/Interdisciplinary Rounds Progress Note        Quality Flow Rounds held on May 1, 2020    Disciplines Attending:  Bedside Nurse, ,  and Nursing Unit Leadership    Mimi Abraham was admitted on 4/30/2020  2:10 PM    Anticipated Discharge Date:  Expected Discharge Date: 05/02/20    Disposition:    Melecio Score:  Melecio Scale Score: 22    Readmission Score:         Discussed patient goal for the day, patient clinical progression, and barriers to discharge.   The following Goal(s) of the Day/Commitment(s) have been identified:  [facility]  May 1, 2020

## 2020-05-01 NOTE — H&P
History and Physical    Patient's Name/Date of Birth: Zac Mcnair / 1980 (3 y.o.)    Date: May 1, 2020     Chief Complaint:   Chief Complaint   Patient presents with    Abdominal Pain     epigastric pain radiating straight thru to back- ongoing issue for years         HPI: Patient is a 3year-old male presents with complaints of severe upper abdominal pain. This began yesterday. It was accompanied initially with nausea. He had one episode of emesis this morning. He reports he has had intermittent pains for the last year or 2 in the same area. He is uncertain whether these are brought on by eating a. He was noted to have gallstones. He had initially mild elevation of bilirubin and normal lipase. However, lipase is significantly elevated on this morning's labs. Bilirubin has also increased. MRCP has been ordered and is pending. He has never had any abdominal surgery before. Past Medical History:   Diagnosis Date    Asthma        Past Surgical History:   Procedure Laterality Date    TONSILLECTOMY         Prior to Admission medications    Not on File       Allergies   Allergen Reactions    Cefaclor Hives       History reviewed. No pertinent family history.        Social History     Socioeconomic History    Marital status: Single     Spouse name: Not on file    Number of children: Not on file    Years of education: Not on file    Highest education level: Not on file   Occupational History    Not on file   Social Needs    Financial resource strain: Not on file    Food insecurity     Worry: Not on file     Inability: Not on file    Transportation needs     Medical: Not on file     Non-medical: Not on file   Tobacco Use    Smoking status: Current Every Day Smoker    Smokeless tobacco: Never Used   Substance and Sexual Activity    Alcohol use: No    Drug use: No    Sexual activity: Yes     Partners: Female   Lifestyle    Physical activity     Days per week: Not on file     Minutes mucosa moist and pink   Neck:  Supple   No palpable cervical lymphoadenopathy   Thyroid without mass or enlargement  Resp: Lungs CTAB   Equal and adequate air exchange without accessory muscle use   No rales, rhonchi or wheeze  CV: S1S2 RRR   No murmur   Intact distal pulses   No edema  GI: Abdomen Soft, tender in the epigastrium and right upper quadrant, non distended   Normoactive bowel sounds   No palpable hepatosplenomegaly  MS: Physiologic ROM of all extremities    Intact distal pulses   No clubbing or cyanosis   Skin Warmand dry; no rash or lesion  Neuro: Alert and oriented; normal and intact dtr's   Psych: Euthymic mood, congruent affect      Ct Abdomen Pelvis W Iv Contrast Additional Contrast? None    Result Date: 4/30/2020  This examination and all examinations utilizing ionizing radiation at this facility are done so according to the ALARA (as low as reasonably achievable) principal for radiation dose reduction. Clinical indications: Epigastric pain. TECHNIQUE: Axial, sagittal, and coronal computed tomography of the abdomen and pelvis was performed following intravenous contrast administration. FINDINGS: Dependent atelectasis. Lung bases are negative for dominant mass or airspace consolidation. The heart is not enlarged. There is no evidence of pericardial fluid. Within the abdomen, the liver is mildly hypoattenuating in relation to the spleen but without focal lesion. The gallbladder, hepatobiliary tree, pancreas, spleen and adrenal glands are unremarkable. The kidneys are negative for evidence of solid mass or hydronephrosis. Within the right lower quadrant of the abdomen, the appendix is normal in caliber and contains air without surrounding inflammation or fluid. There is no evidence of free fluid, free air, or gastrointestinal obstruction. There is no evidence for mesenteric inflammation or lymphadenopathy. Within the pelvis, prostate gland and urinary bladder are unremarkable.  No free fluid or possibility of choledocholithiasis. MRCP is ordered and pending. Possible ERCP if indicated. Will ask GI to see.         Electronically signed by Rufino Montoya DO on 5/1/20 at 9:49 AM EDT

## 2020-05-01 NOTE — CONSULTS
Gastroenterology Consult Note   Radhika MATUTE, NP-C with Mayda Klein M.D. Consult Note        Date of Service: 5/1/2020  Reason for Consult: possible ERCP  Requesting Physician: Jessica Garcia    CHIEF COMPLAINT:  Abdominal pain    History Obtained From:  Patient, EMR    HISTORY OF PRESENT ILLNESS:       Irene Snyder is a 36 y.o. male with significant past medical history of asthma admitted via ED for abdominal pain. Pt reports via phone to epigastric pain ongoing for the last several years. Refers to them to as \"attacks\" for the last several years, becoming more frequent recently. States he had the same abdominal pain last Thursday, went away on its own at that time. Describes the epigastric pain as \"stabbing\" and shoots through to his back. Nothing seems to relieve or aggravate the pain. Having chilling with the pain, denies checking his temperature or recent sick contacts. Up until this point was in his normal state of health. With a good appetite, denies any recent weight loss. Also denies any nausea or vomiting. Normal bowel pattern. Having 1-2 soft brown stools daily, last BM yesterday. Denies any melena or hematochezia. Last EGD 5 years ago uncertain of any details except \"it was normal\". Denies ever having a colon or any [address] of colon cancer. Admission labs CO2 21, , , , , bili 1.3, abso mono 0.99. CT ABD: There is no evidence to suggest acute process on CT of abdomen and pelvis. Hepatic steatosis. Bilateral pars defects L5-S1 with grade 1 anterolisthesis of L5. CXR: Limited study, due to a poor inspiratory effort, no gross abnormality. MRCP: 1. Positive for cholelithiasis and choledocholithiasis. 2. Positive for cholecystitis and pancreatitis. ABD US: Cholelithiasis with gallbladder wall thickening and slight dilation of the common bile duct. Hepatic steatosis. Consultation for possible ERCP. Currently, pt reports via phone, abdominal pain. Has been NPO.  Denies any nausea or vomiting. Labs today , , , , bili 3.3, lipase >3000, WBC 13.2, abso neutro 8.97, abso mono 1.37. Past Medical History:        Diagnosis Date    Asthma      Past Surgical History:        Procedure Laterality Date    TONSILLECTOMY       Current Medications:    Current Facility-Administered Medications: sodium chloride flush 0.9 % injection 10 mL, 10 mL, Intravenous, 2 times per day  sodium chloride flush 0.9 % injection 10 mL, 10 mL, Intravenous, PRN  enoxaparin (LOVENOX) injection 40 mg, 40 mg, Subcutaneous, Daily  lactated ringers infusion, , Intravenous, Continuous  potassium chloride (KLOR-CON M) extended release tablet 40 mEq, 40 mEq, Oral, PRN **OR** potassium bicarb-citric acid (EFFER-K) effervescent tablet 40 mEq, 40 mEq, Oral, PRN **OR** potassium chloride 10 mEq/100 mL IVPB (Peripheral Line), 10 mEq, Intravenous, PRN  ciprofloxacin (CIPRO) IVPB 400 mg, 400 mg, Intravenous, Q12H  metronidazole (FLAGYL) 500 mg in NaCl 100 mL IVPB premix, 500 mg, Intravenous, Q8H  HYDROmorphone (DILAUDID) injection 0.25 mg, 0.25 mg, Intravenous, Q3H PRN **OR** HYDROmorphone (DILAUDID) injection 0.5 mg, 0.5 mg, Intravenous, Q3H PRN  acetaminophen (TYLENOL) tablet 650 mg, 650 mg, Oral, Q6H  ondansetron (ZOFRAN) injection 4 mg, 4 mg, Intravenous, Q6H PRN  famotidine (PEPCID) injection 20 mg, 20 mg, Intravenous, BID  ketorolac (TORADOL) injection 15 mg, 15 mg, Intravenous, Q6H  HYDROmorphone (DILAUDID) injection 0.5 mg, 0.5 mg, Intravenous, Once    Allergies:  Cefaclor    Social History:    Tobacco:  Pt reports 1PPD for 20+ years  Alcohol:  Pt denies  Illicit Drugs: Pt denies    Family History:   Father & mother- alive, healthy  4 brothers & 3 sisters- alive, healthy  3 sons & 1 daughter- alive, healthy    REVIEW OF SYSTEMS:    Aside from what was mentioned in the PMH and HPI, essentially unremarkable, all others negative.     PHYSICAL EXAM:      Vitals:    BP (!) 163/91   Pulse 82 atelectasis. Lung bases are negative for dominant mass or airspace consolidation. The heart is not enlarged. There is no evidence of pericardial fluid. Within the abdomen, the liver is mildly hypoattenuating in relation to the spleen but without focal lesion. The gallbladder, hepatobiliary tree, pancreas, spleen and adrenal glands are unremarkable. The kidneys are negative for evidence of solid mass or hydronephrosis. Within the right lower quadrant of the abdomen, the appendix is normal in caliber and contains air without surrounding inflammation or fluid. There is no evidence of free fluid, free air, or gastrointestinal obstruction. There is no evidence for mesenteric inflammation or lymphadenopathy. Within the pelvis, prostate gland and urinary bladder are unremarkable. No free fluid or adenopathy in the pelvis. Bone island is present within the proximal right femur. There are bilateral pars defects at L5-S1 with a few millimeters anterior spondylolisthesis of L5. Degenerative spondylosis and facet arthropathy are identified within the spine. Skeletal structures are negative for evidence of aggressive process or acute fracture. The arterial and venous structures enhance appropriately. There is no evidence to suggest acute process on CT of abdomen and pelvis. Hepatic steatosis. Bilateral pars defects L5-S1 with grade 1 anterolisthesis of L5. Xr Chest Portable    Result Date: 2020  Patient MRN: [id number] : 1980 Age:  36 years Gender: Male Order Date: 2020 2:30 PM Exam: XR CHEST PORTABLE Number of Images: 1 view Indication:   epigastric pain epigastric pain Comparison: None. Findings:  The heart appears prominent The pulmonary vasculature appears to be crowded The aorta appears to be tortuous There is a poor inspiratory effort     Limited study, due to a poor inspiratory effort, no gross abnormality     Mri Abdomen W Wo Contrast Mrcp    Result Date: 2020  Patient MRN:  [id number] : 1980 Age: 36 years Gender: Male Order Date:  4/30/2020 8:30 PM EXAM: MRI ABDOMEN W WO CONTRAST MRCP INDICATION:  evaluate for ampullary mass vs. choledocholithiasis evaluate for ampullary mass vs. choledocholithiasis . TECHNIQUE: A total of 18 sequences were obtained. MRI abdomen plus MRCP abdomen sequences without contrast. COMPARISON: Abdomen ultrasound 4/30/2020. CT abdomen 4/30/2020. FINDINGS:  Positive for acute cholecystitis. The gallbladder is mildly distended. Numerous small dependent gallstones in the 2 to 4 mm size range. There is a distal transverse fold with a second collection of multiple tiny gallstones trapped in the gallbladder fundus region. Diffuse gallbladder wall thickening 4 to 5 mm. There is a thin rim of pericholecystic edema. Mild right upper quadrant edema extending from the gallbladder, and extending around the duodenum and pancreatic head. Mild edema diffusely around the entire pancreas. The pancreas is mildly thickened. Consistent with pancreatitis. The main pancreatic duct is not dilated and measures maximum 1 mm. No evidence for focal pancreatic mass. Gallbladder size 11 cm long by 3 cm diameter. Common bile duct 6 mm. Common hepatic duct 5 mm. Intrahepatic bile ducts are slightly dilated at the central liver hilum. The MRCP  images demonstrates several small rounded filling defects in the distal common bile duct measuring in the 3 mm size range. These are not seen on the axial images but are seen on the coronal T2 image. The axial T2 image skips this area of the duct due to breathing motion. No thrombosis of the portal vein. Pancreatic inflammation surrounds the distal splenic vein without thrombosis. Normal appearance of the liver, spleen, adrenal glands, kidneys, aorta, IVC. 1. Positive for cholelithiasis and choledocholithiasis. 2. Positive for cholecystitis and pancreatitis. NOTE: This is an abnormal report.      Us Abdomen Limited    Result Date: 4/30/2020  Comparison: CT

## 2020-05-02 ENCOUNTER — APPOINTMENT (OUTPATIENT)
Dept: GENERAL RADIOLOGY | Age: 40
DRG: 263 | End: 2020-05-02
Payer: COMMERCIAL

## 2020-05-02 LAB
ALBUMIN SERPL-MCNC: 3.6 G/DL (ref 3.5–5.2)
ALP BLD-CCNC: 126 U/L (ref 40–129)
ALT SERPL-CCNC: 246 U/L (ref 0–40)
AMYLASE: 653 U/L (ref 20–100)
ANION GAP SERPL CALCULATED.3IONS-SCNC: 11 MMOL/L (ref 7–16)
AST SERPL-CCNC: 63 U/L (ref 0–39)
BILIRUB SERPL-MCNC: 1.4 MG/DL (ref 0–1.2)
BILIRUBIN DIRECT: 0.7 MG/DL (ref 0–0.3)
BILIRUBIN, INDIRECT: 0.7 MG/DL (ref 0–1)
BUN BLDV-MCNC: 9 MG/DL (ref 6–20)
CALCIUM SERPL-MCNC: 8.9 MG/DL (ref 8.6–10.2)
CHLORIDE BLD-SCNC: 103 MMOL/L (ref 98–107)
CO2: 23 MMOL/L (ref 22–29)
CREAT SERPL-MCNC: 0.7 MG/DL (ref 0.7–1.2)
GFR AFRICAN AMERICAN: >60
GFR NON-AFRICAN AMERICAN: >60 ML/MIN/1.73
GLUCOSE BLD-MCNC: 110 MG/DL (ref 74–99)
HCT VFR BLD CALC: 36.4 % (ref 37–54)
HEMOGLOBIN: 12.3 G/DL (ref 12.5–16.5)
LIPASE: 724 U/L (ref 13–60)
MCH RBC QN AUTO: 30.5 PG (ref 26–35)
MCHC RBC AUTO-ENTMCNC: 33.8 % (ref 32–34.5)
MCV RBC AUTO: 90.3 FL (ref 80–99.9)
PDW BLD-RTO: 14.6 FL (ref 11.5–15)
PLATELET # BLD: 230 E9/L (ref 130–450)
PMV BLD AUTO: 11.1 FL (ref 7–12)
POTASSIUM REFLEX MAGNESIUM: 3.6 MMOL/L (ref 3.5–5)
RBC # BLD: 4.03 E12/L (ref 3.8–5.8)
SARS-COV-2, NAAT: NOT DETECTED
SODIUM BLD-SCNC: 137 MMOL/L (ref 132–146)
TOTAL PROTEIN: 6.5 G/DL (ref 6.4–8.3)
TROPONIN: <0.01 NG/ML (ref 0–0.03)
WBC # BLD: 16.8 E9/L (ref 4.5–11.5)

## 2020-05-02 PROCEDURE — 71045 X-RAY EXAM CHEST 1 VIEW: CPT

## 2020-05-02 PROCEDURE — U0002 COVID-19 LAB TEST NON-CDC: HCPCS

## 2020-05-02 PROCEDURE — 2580000003 HC RX 258: Performed by: TRANSPLANT SURGERY

## 2020-05-02 PROCEDURE — 36415 COLL VENOUS BLD VENIPUNCTURE: CPT

## 2020-05-02 PROCEDURE — 80053 COMPREHEN METABOLIC PANEL: CPT

## 2020-05-02 PROCEDURE — 6370000000 HC RX 637 (ALT 250 FOR IP): Performed by: TRANSPLANT SURGERY

## 2020-05-02 PROCEDURE — 99233 SBSQ HOSP IP/OBS HIGH 50: CPT | Performed by: SURGERY

## 2020-05-02 PROCEDURE — 84484 ASSAY OF TROPONIN QUANT: CPT

## 2020-05-02 PROCEDURE — 1200000000 HC SEMI PRIVATE

## 2020-05-02 PROCEDURE — 6360000002 HC RX W HCPCS: Performed by: TRANSPLANT SURGERY

## 2020-05-02 PROCEDURE — 80076 HEPATIC FUNCTION PANEL: CPT

## 2020-05-02 PROCEDURE — 85027 COMPLETE CBC AUTOMATED: CPT

## 2020-05-02 PROCEDURE — 2500000003 HC RX 250 WO HCPCS: Performed by: TRANSPLANT SURGERY

## 2020-05-02 PROCEDURE — 83690 ASSAY OF LIPASE: CPT

## 2020-05-02 PROCEDURE — 82150 ASSAY OF AMYLASE: CPT

## 2020-05-02 PROCEDURE — 99253 IP/OBS CNSLTJ NEW/EST LOW 45: CPT | Performed by: FAMILY MEDICINE

## 2020-05-02 RX ADMIN — SODIUM CHLORIDE, POTASSIUM CHLORIDE, SODIUM LACTATE AND CALCIUM CHLORIDE: 600; 310; 30; 20 INJECTION, SOLUTION INTRAVENOUS at 15:36

## 2020-05-02 RX ADMIN — METRONIDAZOLE 500 MG: 500 INJECTION, SOLUTION INTRAVENOUS at 17:19

## 2020-05-02 RX ADMIN — KETOROLAC TROMETHAMINE 15 MG: 30 INJECTION, SOLUTION INTRAMUSCULAR; INTRAVENOUS at 10:04

## 2020-05-02 RX ADMIN — CIPROFLOXACIN 400 MG: 2 INJECTION, SOLUTION INTRAVENOUS at 02:58

## 2020-05-02 RX ADMIN — KETOROLAC TROMETHAMINE 15 MG: 30 INJECTION, SOLUTION INTRAMUSCULAR; INTRAVENOUS at 03:36

## 2020-05-02 RX ADMIN — METRONIDAZOLE 500 MG: 500 INJECTION, SOLUTION INTRAVENOUS at 08:35

## 2020-05-02 RX ADMIN — SODIUM CHLORIDE, POTASSIUM CHLORIDE, SODIUM LACTATE AND CALCIUM CHLORIDE: 600; 310; 30; 20 INJECTION, SOLUTION INTRAVENOUS at 20:42

## 2020-05-02 RX ADMIN — METRONIDAZOLE 500 MG: 500 INJECTION, SOLUTION INTRAVENOUS at 00:53

## 2020-05-02 RX ADMIN — FAMOTIDINE 20 MG: 10 INJECTION INTRAVENOUS at 08:36

## 2020-05-02 RX ADMIN — ACETAMINOPHEN 650 MG: 325 TABLET ORAL at 20:38

## 2020-05-02 RX ADMIN — KETOROLAC TROMETHAMINE 15 MG: 30 INJECTION, SOLUTION INTRAMUSCULAR; INTRAVENOUS at 15:36

## 2020-05-02 RX ADMIN — KETOROLAC TROMETHAMINE 15 MG: 30 INJECTION, SOLUTION INTRAMUSCULAR; INTRAVENOUS at 20:39

## 2020-05-02 RX ADMIN — ACETAMINOPHEN 650 MG: 325 TABLET ORAL at 15:36

## 2020-05-02 RX ADMIN — HYDROMORPHONE HYDROCHLORIDE 0.5 MG: 1 INJECTION, SOLUTION INTRAMUSCULAR; INTRAVENOUS; SUBCUTANEOUS at 00:53

## 2020-05-02 RX ADMIN — ENOXAPARIN SODIUM 40 MG: 40 INJECTION SUBCUTANEOUS at 08:39

## 2020-05-02 RX ADMIN — FAMOTIDINE 20 MG: 10 INJECTION INTRAVENOUS at 20:38

## 2020-05-02 RX ADMIN — ACETAMINOPHEN 650 MG: 325 TABLET ORAL at 10:04

## 2020-05-02 RX ADMIN — CIPROFLOXACIN 400 MG: 2 INJECTION, SOLUTION INTRAVENOUS at 15:36

## 2020-05-02 RX ADMIN — Medication 10 ML: at 08:37

## 2020-05-02 RX ADMIN — ACETAMINOPHEN 650 MG: 325 TABLET ORAL at 02:58

## 2020-05-02 RX ADMIN — Medication 10 ML: at 20:42

## 2020-05-02 RX ADMIN — SODIUM CHLORIDE, POTASSIUM CHLORIDE, SODIUM LACTATE AND CALCIUM CHLORIDE: 600; 310; 30; 20 INJECTION, SOLUTION INTRAVENOUS at 00:55

## 2020-05-02 ASSESSMENT — PAIN DESCRIPTION - PAIN TYPE
TYPE: ACUTE PAIN
TYPE: ACUTE PAIN

## 2020-05-02 ASSESSMENT — PAIN DESCRIPTION - LOCATION
LOCATION: ABDOMEN
LOCATION: CHEST
LOCATION: CHEST

## 2020-05-02 ASSESSMENT — PAIN DESCRIPTION - ONSET
ONSET: SUDDEN
ONSET: ON-GOING
ONSET: ON-GOING

## 2020-05-02 ASSESSMENT — PAIN SCALES - GENERAL
PAINLEVEL_OUTOF10: 0
PAINLEVEL_OUTOF10: 6
PAINLEVEL_OUTOF10: 0
PAINLEVEL_OUTOF10: 3
PAINLEVEL_OUTOF10: 7
PAINLEVEL_OUTOF10: 6
PAINLEVEL_OUTOF10: 4
PAINLEVEL_OUTOF10: 2
PAINLEVEL_OUTOF10: 0

## 2020-05-02 ASSESSMENT — PAIN DESCRIPTION - DESCRIPTORS
DESCRIPTORS: PRESSURE
DESCRIPTORS: PRESSURE
DESCRIPTORS: SORE;ACHING

## 2020-05-02 ASSESSMENT — PAIN DESCRIPTION - FREQUENCY
FREQUENCY: INTERMITTENT
FREQUENCY: INTERMITTENT
FREQUENCY: CONTINUOUS

## 2020-05-02 ASSESSMENT — PAIN - FUNCTIONAL ASSESSMENT
PAIN_FUNCTIONAL_ASSESSMENT: PREVENTS OR INTERFERES SOME ACTIVE ACTIVITIES AND ADLS
PAIN_FUNCTIONAL_ASSESSMENT: ACTIVITIES ARE NOT PREVENTED
PAIN_FUNCTIONAL_ASSESSMENT: PREVENTS OR INTERFERES SOME ACTIVE ACTIVITIES AND ADLS

## 2020-05-02 ASSESSMENT — PAIN DESCRIPTION - ORIENTATION
ORIENTATION: RIGHT;LEFT
ORIENTATION: RIGHT;LEFT

## 2020-05-02 ASSESSMENT — PAIN DESCRIPTION - PROGRESSION
CLINICAL_PROGRESSION: NOT CHANGED
CLINICAL_PROGRESSION: NOT CHANGED
CLINICAL_PROGRESSION: GRADUALLY IMPROVING

## 2020-05-02 NOTE — PROGRESS NOTES
PROGRESS NOTE    Patient Presents with/Seen in Consultation For      *possible ERCP  CHIEF COMPLAINT:  Abdominal pain    Subjective:     Telephone visit done with pt and BS TRACY Irving. Per BS RN pt had a bout of shortness of breath with chest pain this am, EKG \"normal\" per RN. Pt now on 2 liters O2 per NC doing better. LR infusing at 150 cc/hr. Pt states he has aching to sharp pain epigastric and RUQ regions 4/10 worse with deep breath. Pt denies nausea or vomiting, states feels better than yesterday. Pt states he is tolerating clears. Pt denies chills, low grade temp 99.1°F last night, normal now. Pt states surgery d/w him their plans for cholecystectomy for Monday. Lab results d/w pt with all questions answered. Chart/labs/imaging reviewed extensively to include vital signs. Review of Systems  Aside from what was mentioned in the PMH and HPI, essentially unremarkable, all others negative.     Objective:     BP (!) 142/80   Pulse 80   Temp 98.6 °F (37 °C) (Axillary)   Resp 16   Ht 5' 11\" (1.803 m)   Wt 255 lb (115.7 kg)   SpO2 98%   BMI 35.57 kg/m²     ASSESSMENT DEFERRED - TELEPHONE VISIT    indomethacin (INDOCIN) 50 MG suppository 100 mg, See Admin Instructions  sodium chloride flush 0.9 % injection 10 mL, 2 times per day  sodium chloride flush 0.9 % injection 10 mL, PRN  enoxaparin (LOVENOX) injection 40 mg, Daily  lactated ringers infusion, Continuous  potassium chloride (KLOR-CON M) extended release tablet 40 mEq, PRN    Or  potassium bicarb-citric acid (EFFER-K) effervescent tablet 40 mEq, PRN    Or  potassium chloride 10 mEq/100 mL IVPB (Peripheral Line), PRN  ciprofloxacin (CIPRO) IVPB 400 mg, Q12H  metronidazole (FLAGYL) 500 mg in NaCl 100 mL IVPB premix, Q8H  HYDROmorphone (DILAUDID) injection 0.25 mg, Q3H PRN    Or  HYDROmorphone (DILAUDID) injection 0.5 mg, Q3H PRN  acetaminophen (TYLENOL) tablet 650 mg, Q6H  ondansetron (ZOFRAN) injection 4 mg, Q6H PRN  famotidine (PEPCID) injection 20 mg, BID  ketorolac (TORADOL) injection 15 mg, Q6H  HYDROmorphone (DILAUDID) injection 0.5 mg, Once         Data Review  CBC:   Lab Results   Component Value Date    WBC 16.8 05/02/2020    RBC 4.03 05/02/2020    RBC 4.99 04/12/2017    HGB 12.3 05/02/2020    HCT 36.4 05/02/2020    MCV 90.3 05/02/2020    MCH 30.5 05/02/2020    MCHC 33.8 05/02/2020    RDW 14.6 05/02/2020     05/02/2020    MPV 11.1 05/02/2020     CMP:    Lab Results   Component Value Date     05/02/2020    K 3.6 05/02/2020     05/02/2020    CO2 23 05/02/2020    BUN 9 05/02/2020    CREATININE 0.7 05/02/2020    GFRAA >60 05/02/2020    LABGLOM >60 05/02/2020    GLUCOSE 110 05/02/2020    PROT 6.5 05/02/2020    LABALBU 3.6 05/02/2020    CALCIUM 8.9 05/02/2020    BILITOT 1.4 05/02/2020    ALKPHOS 126 05/02/2020    AST 63 05/02/2020     05/02/2020     Hepatic Function Panel:    Lab Results   Component Value Date    ALKPHOS 126 05/02/2020     05/02/2020    AST 63 05/02/2020    PROT 6.5 05/02/2020    BILITOT 1.4 05/02/2020    BILIDIR 0.7 05/02/2020    IBILI 0.7 05/02/2020    LABALBU 3.6 05/02/2020       PT/INR:    Lab Results   Component Value Date    PROTIME 10.3 05/01/2020    INR 0.9 05/01/2020       Assessment:     Active Problems:  · Choledocholithiasis  · Abdominal pain, epigastric and RUQ  · Gallstone pancreatitis, amylase and lipase improving  · Hepatic steatosis  · Cholecystitis   · Elevated LFTs  · Leukocytosis   · Hyperbilirubinemia   · S/P ERCP with papillotomy and stones extraction 5/1/2020 - Dilated common bile duct with multiple filling defects consistent with stones. Papillotomy was done and six medium-sized stones were removed. Excellent drainage was obtained.     Plan:     · Clear liquid diet, DO NOT ADVANCE  · Continue IV LR as ordered  · Continue to medicate for pain and nausea as ordered  · Monitor CMP, CBC, Radhika, Lip  · Continue IV antibiotic as ordered per General Surgery  · Continue to monitor     Discussed with Dr. Sarah Wright per Dr. Daniele Xavier [id number], FNP-BC 5/2/2020 11:26 AM For Dr. Magaly Owusu

## 2020-05-02 NOTE — PROGRESS NOTES
137 05/02/2020    K 3.6 05/02/2020     05/02/2020    CO2 23 05/02/2020    BUN 9 05/02/2020    CREATININE 0.7 05/02/2020    GLUCOSE 110 05/02/2020    CALCIUM 8.9 05/02/2020      Lab Results   Component Value Date     05/02/2020    K 3.6 05/02/2020     05/02/2020    CO2 23 05/02/2020    BUN 9 05/02/2020    CREATININE 0.7 05/02/2020    GLUCOSE 110 (H) 05/02/2020    CALCIUM 8.9 05/02/2020    PROT 6.5 05/02/2020    LABALBU 3.6 05/02/2020    BILITOT 1.4 (H) 05/02/2020    ALKPHOS 126 05/02/2020    AST 63 (H) 05/02/2020     (H) 05/02/2020    LABGLOM >60 05/02/2020    GFRAA >60 05/02/2020     Lab Results   Component Value Date     (H) 05/02/2020    AST 63 (H) 05/02/2020    ALKPHOS 126 05/02/2020    BILITOT 1.4 (H) 05/02/2020       FL ERCP BILIARY AND PANCREATIC S&I   Final Result   Cholangiogram demonstrates filling defects likely calculi or air   bubbles with a balloon inflated in the common duct. The exam has been dictated and signed by Luke Fernandez. Cheryn Lundborg, APRN-CNP   and Minh Verma MD, reviewed and concurred with these findings. MRI ABDOMEN W WO CONTRAST MRCP   Final Result      1. Positive for cholelithiasis and choledocholithiasis. 2. Positive for cholecystitis and pancreatitis. NOTE: This is an abnormal report. US ABDOMEN LIMITED   Final Result      Cholelithiasis with gallbladder wall thickening and slight dilation of   the common bile duct. Hepatic steatosis. CT ABDOMEN PELVIS W IV CONTRAST Additional Contrast? None   Final Result      There is no evidence to suggest acute process on CT of abdomen and   pelvis. Hepatic steatosis. Bilateral pars defects L5-S1 with grade 1 anterolisthesis of L5.             XR CHEST PORTABLE   Final Result   Limited study, due to a poor inspiratory effort, no gross   abnormality                     XR CHEST PORTABLE    (Results Pending)     Abdomen is soft nontender on exam today        Extremeties:  No lower extremity edema. Assessment/Plan:   3 59-year-old male with gallstone pancreatitis, choledocholithiasis. Continue with clear liquid diet. Await improvement of pancreatitis, potential cholecystectomy on Monday if continues to clinically improve.       Electronically signed by Oswald Sterling DO on 5/2/20 at 12:01 PM EDT

## 2020-05-02 NOTE — CONSULTS
[address] [place] [address] for Medical Management      Reason for Consult:  Shortness of breath and chest pressure     History of Present Illness:  1 day history of low grade fever and shortness of breath and chest pressure with mild cough. His temp today was slightly elevated. He was admitted with choledocholithiasis, s/p extraction of 6 gallstones. He had developed cholecystitis and pancreatitis. Informant(s) for H&P:patient himself    REVIEW OF SYSTEMS:  A comprehensive review of systems was negative except for: what is in the HPI      PMH:  Past Medical History:   Diagnosis Date    Asthma     as a child       Surgical History:  Past Surgical History:   Procedure Laterality Date    TONSILLECTOMY      VASECTOMY  2016       Medications Prior to Admission:    Prior to Admission medications    Not on File       Allergies:    Cefaclor    Social History:    reports that he has been smoking. He has never used smokeless tobacco. He reports that he does not drink alcohol or use drugs. Family History:   family history includes Arthritis in his father and mother; Coronary Art Dis in his father; Other in his mother.       PHYSICAL EXAM:  Vitals:  BP (!) 150/81   Pulse 98   Temp 100.2 °F (37.9 °C) (Infrared)   Resp 16   Ht 5' 11\" (1.803 m)   Wt 255 lb (115.7 kg)   SpO2 96%   BMI 35.57 kg/m²     General Appearance: alert and oriented to person, place and time and in no acute distress  Skin: warm and dry  Head: normocephalic and atraumatic  Eyes: pupils equal, round, and reactive to light, extraocular eye movements intact, conjunctivae normal  Neck: neck supple and non tender without mass   Pulmonary/Chest: clear to auscultation bilaterally- no wheezes, rales or rhonchi, normal air movement, no respiratory distress  Cardiovascular: normal rate, normal S1 and S2 and no carotid bruits  Abdomen: soft, mildly ttp, non-distended, normal bowel sounds, no masses or organomegaly  Extremities:

## 2020-05-03 ENCOUNTER — ANESTHESIA (OUTPATIENT)
Dept: OPERATING ROOM | Age: 40
DRG: 263 | End: 2020-05-03
Payer: COMMERCIAL

## 2020-05-03 ENCOUNTER — ANESTHESIA EVENT (OUTPATIENT)
Dept: OPERATING ROOM | Age: 40
DRG: 263 | End: 2020-05-03
Payer: COMMERCIAL

## 2020-05-03 LAB
ALBUMIN SERPL-MCNC: 3.3 G/DL (ref 3.5–5.2)
ALP BLD-CCNC: 101 U/L (ref 40–129)
ALT SERPL-CCNC: 149 U/L (ref 0–40)
AMYLASE: 224 U/L (ref 20–100)
ANION GAP SERPL CALCULATED.3IONS-SCNC: 10 MMOL/L (ref 7–16)
AST SERPL-CCNC: 26 U/L (ref 0–39)
BASOPHILS ABSOLUTE: 0.03 E9/L (ref 0–0.2)
BASOPHILS RELATIVE PERCENT: 0.2 % (ref 0–2)
BILIRUB SERPL-MCNC: 0.9 MG/DL (ref 0–1.2)
BUN BLDV-MCNC: 10 MG/DL (ref 6–20)
CALCIUM SERPL-MCNC: 8.9 MG/DL (ref 8.6–10.2)
CHLORIDE BLD-SCNC: 107 MMOL/L (ref 98–107)
CO2: 23 MMOL/L (ref 22–29)
CREAT SERPL-MCNC: 0.7 MG/DL (ref 0.7–1.2)
EOSINOPHILS ABSOLUTE: 0.33 E9/L (ref 0.05–0.5)
EOSINOPHILS RELATIVE PERCENT: 2.5 % (ref 0–6)
GFR AFRICAN AMERICAN: >60
GFR NON-AFRICAN AMERICAN: >60 ML/MIN/1.73
GLUCOSE BLD-MCNC: 100 MG/DL (ref 74–99)
HCT VFR BLD CALC: 33.9 % (ref 37–54)
HEMOGLOBIN: 11.3 G/DL (ref 12.5–16.5)
IMMATURE GRANULOCYTES #: 0.06 E9/L
IMMATURE GRANULOCYTES %: 0.5 % (ref 0–5)
LIPASE: 126 U/L (ref 13–60)
LYMPHOCYTES ABSOLUTE: 2.81 E9/L (ref 1.5–4)
LYMPHOCYTES RELATIVE PERCENT: 21.3 % (ref 20–42)
MCH RBC QN AUTO: 30.6 PG (ref 26–35)
MCHC RBC AUTO-ENTMCNC: 33.3 % (ref 32–34.5)
MCV RBC AUTO: 91.9 FL (ref 80–99.9)
MONOCYTES ABSOLUTE: 1.12 E9/L (ref 0.1–0.95)
MONOCYTES RELATIVE PERCENT: 8.5 % (ref 2–12)
NEUTROPHILS ABSOLUTE: 8.85 E9/L (ref 1.8–7.3)
NEUTROPHILS RELATIVE PERCENT: 67 % (ref 43–80)
PDW BLD-RTO: 14.5 FL (ref 11.5–15)
PLATELET # BLD: 235 E9/L (ref 130–450)
PMV BLD AUTO: 11.1 FL (ref 7–12)
POTASSIUM REFLEX MAGNESIUM: 3.6 MMOL/L (ref 3.5–5)
RBC # BLD: 3.69 E12/L (ref 3.8–5.8)
SODIUM BLD-SCNC: 140 MMOL/L (ref 132–146)
TOTAL PROTEIN: 6.3 G/DL (ref 6.4–8.3)
TROPONIN: <0.01 NG/ML (ref 0–0.03)
WBC # BLD: 13.2 E9/L (ref 4.5–11.5)

## 2020-05-03 PROCEDURE — 36415 COLL VENOUS BLD VENIPUNCTURE: CPT

## 2020-05-03 PROCEDURE — 2500000003 HC RX 250 WO HCPCS: Performed by: TRANSPLANT SURGERY

## 2020-05-03 PROCEDURE — 80053 COMPREHEN METABOLIC PANEL: CPT

## 2020-05-03 PROCEDURE — 2580000003 HC RX 258: Performed by: TRANSPLANT SURGERY

## 2020-05-03 PROCEDURE — 83690 ASSAY OF LIPASE: CPT

## 2020-05-03 PROCEDURE — 82150 ASSAY OF AMYLASE: CPT

## 2020-05-03 PROCEDURE — 99232 SBSQ HOSP IP/OBS MODERATE 35: CPT | Performed by: FAMILY MEDICINE

## 2020-05-03 PROCEDURE — 84484 ASSAY OF TROPONIN QUANT: CPT

## 2020-05-03 PROCEDURE — 1200000000 HC SEMI PRIVATE

## 2020-05-03 PROCEDURE — 85025 COMPLETE CBC W/AUTO DIFF WBC: CPT

## 2020-05-03 PROCEDURE — 99233 SBSQ HOSP IP/OBS HIGH 50: CPT | Performed by: SURGERY

## 2020-05-03 PROCEDURE — 6360000002 HC RX W HCPCS: Performed by: TRANSPLANT SURGERY

## 2020-05-03 PROCEDURE — 6370000000 HC RX 637 (ALT 250 FOR IP): Performed by: TRANSPLANT SURGERY

## 2020-05-03 RX ADMIN — ACETAMINOPHEN 650 MG: 325 TABLET ORAL at 04:24

## 2020-05-03 RX ADMIN — METRONIDAZOLE 500 MG: 500 INJECTION, SOLUTION INTRAVENOUS at 16:48

## 2020-05-03 RX ADMIN — CIPROFLOXACIN 400 MG: 2 INJECTION, SOLUTION INTRAVENOUS at 15:16

## 2020-05-03 RX ADMIN — ENOXAPARIN SODIUM 40 MG: 40 INJECTION SUBCUTANEOUS at 09:44

## 2020-05-03 RX ADMIN — KETOROLAC TROMETHAMINE 15 MG: 30 INJECTION, SOLUTION INTRAMUSCULAR; INTRAVENOUS at 09:43

## 2020-05-03 RX ADMIN — ACETAMINOPHEN 650 MG: 325 TABLET ORAL at 15:16

## 2020-05-03 RX ADMIN — FAMOTIDINE 20 MG: 10 INJECTION INTRAVENOUS at 09:44

## 2020-05-03 RX ADMIN — Medication 10 ML: at 21:26

## 2020-05-03 RX ADMIN — KETOROLAC TROMETHAMINE 15 MG: 30 INJECTION, SOLUTION INTRAMUSCULAR; INTRAVENOUS at 15:16

## 2020-05-03 RX ADMIN — SODIUM CHLORIDE, POTASSIUM CHLORIDE, SODIUM LACTATE AND CALCIUM CHLORIDE: 600; 310; 30; 20 INJECTION, SOLUTION INTRAVENOUS at 20:20

## 2020-05-03 RX ADMIN — ACETAMINOPHEN 650 MG: 325 TABLET ORAL at 09:43

## 2020-05-03 RX ADMIN — KETOROLAC TROMETHAMINE 15 MG: 30 INJECTION, SOLUTION INTRAMUSCULAR; INTRAVENOUS at 04:24

## 2020-05-03 RX ADMIN — ACETAMINOPHEN 650 MG: 325 TABLET ORAL at 21:27

## 2020-05-03 RX ADMIN — METRONIDAZOLE 500 MG: 500 INJECTION, SOLUTION INTRAVENOUS at 08:08

## 2020-05-03 RX ADMIN — FAMOTIDINE 20 MG: 10 INJECTION INTRAVENOUS at 21:26

## 2020-05-03 RX ADMIN — CIPROFLOXACIN 400 MG: 2 INJECTION, SOLUTION INTRAVENOUS at 04:35

## 2020-05-03 RX ADMIN — Medication 10 ML: at 09:45

## 2020-05-03 RX ADMIN — METRONIDAZOLE 500 MG: 500 INJECTION, SOLUTION INTRAVENOUS at 00:32

## 2020-05-03 RX ADMIN — KETOROLAC TROMETHAMINE 15 MG: 30 INJECTION, SOLUTION INTRAMUSCULAR; INTRAVENOUS at 21:26

## 2020-05-03 ASSESSMENT — PAIN DESCRIPTION - PROGRESSION: CLINICAL_PROGRESSION: GRADUALLY IMPROVING

## 2020-05-03 ASSESSMENT — PAIN SCALES - GENERAL
PAINLEVEL_OUTOF10: 1
PAINLEVEL_OUTOF10: 4
PAINLEVEL_OUTOF10: 0
PAINLEVEL_OUTOF10: 1
PAINLEVEL_OUTOF10: 1
PAINLEVEL_OUTOF10: 0

## 2020-05-03 ASSESSMENT — PAIN DESCRIPTION - DESCRIPTORS: DESCRIPTORS: SORE

## 2020-05-03 ASSESSMENT — LIFESTYLE VARIABLES: SMOKING_STATUS: 1

## 2020-05-03 ASSESSMENT — PAIN DESCRIPTION - ORIENTATION: ORIENTATION: RIGHT;LEFT;MID;LOWER

## 2020-05-03 ASSESSMENT — PAIN DESCRIPTION - FREQUENCY: FREQUENCY: CONTINUOUS

## 2020-05-03 ASSESSMENT — PAIN DESCRIPTION - PAIN TYPE: TYPE: ACUTE PAIN

## 2020-05-03 ASSESSMENT — PAIN - FUNCTIONAL ASSESSMENT: PAIN_FUNCTIONAL_ASSESSMENT: ACTIVITIES ARE NOT PREVENTED

## 2020-05-03 ASSESSMENT — PAIN DESCRIPTION - LOCATION: LOCATION: ABDOMEN

## 2020-05-03 ASSESSMENT — PAIN DESCRIPTION - ONSET: ONSET: ON-GOING

## 2020-05-03 NOTE — ANESTHESIA PRE PROCEDURE
(DILAUDID) injection 0.25 mg  0.25 mg Intravenous Q3H PRN Matthias Avina III, MD   0.25 mg at 05/01/20 0231    Or    HYDROmorphone (DILAUDID) injection 0.5 mg  0.5 mg Intravenous Q3H PRN Anny Hopson III, MD   0.5 mg at 05/02/20 0053    acetaminophen (TYLENOL) tablet 650 mg  650 mg Oral Q6H Leilani Whitfield III, MD   650 mg at 05/03/20 1516    ondansetron (ZOFRAN) injection 4 mg  4 mg Intravenous Q6H PRN Anny Hopson III, MD        famotidine (PEPCID) injection 20 mg  20 mg Intravenous BID Leilani hWitfield III, MD   20 mg at 05/03/20 0944    ketorolac (TORADOL) injection 15 mg  15 mg Intravenous Q6H Leilani Whitfield III, MD   15 mg at 05/03/20 1516    HYDROmorphone (DILAUDID) injection 0.5 mg  0.5 mg Intravenous Once Sara Moser DO           Allergies: Allergies   Allergen Reactions    Cefaclor Hives       Problem List:    Patient Active Problem List   Diagnosis Code    Chronic cholecystitis due to cholelithiasis with choledocholithiasis K80.64    Cholecystitis K81.9       Past Medical History:        Diagnosis Date    Asthma     as a child       Past Surgical History:        Procedure Laterality Date    TONSILLECTOMY      VASECTOMY  2016       Social History:    Social History     Tobacco Use    Smoking status: Current Every Day Smoker    Smokeless tobacco: Never Used   Substance Use Topics    Alcohol use: No                                Ready to quit: Not Answered  Counseling given: Not Answered      Vital Signs (Current): There were no vitals filed for this visit.                                            BP Readings from Last 3 Encounters:   05/03/20 129/71   05/01/20 (!) 142/79   09/14/16 125/66       NPO Status:                                                                                 BMI:   Wt Readings from Last 3 Encounters:   04/30/20 255 lb (115.7 kg)   09/14/16 215 lb (97.5 kg)     There is no height or weight reviewed  Rhythm: regular  Rate: normal                 ROS comment: EKG 4/30/2020:  Sinus bradycardia  Otherwise normal ECG  No previous ECGs available  Confirmed by Rey Goyal ([id number]) on 5/1/2020 8:15:33 AM     Neuro/Psych:   Negative Neuro/Psych ROS              GI/Hepatic/Renal:   (+) liver disease (Hepatic steatosis):,          ROS comment: Obesity, BMI: 35.6   gallstone pancreatitis, choledocholithiasis s/p ERCP on 5/1/2020, scheduled for cholecystectomy on 5/4/2020    MRA abdomen (5/1/2020):  1. Positive for cholelithiasis and choledocholithiasis. 2. Positive for cholecystitis and pancreatitis. .   Endo/Other:    (+) blood dyscrasia (Hgb: 11.3): anemia:., .                 Abdominal:           Vascular: negative vascular ROS. Anesthesia Plan      general     ASA 3       Induction: intravenous. MIPS: Postoperative opioids intended, Prophylactic antiemetics administered and Postoperative trial extubation. Anesthetic plan and risks discussed with patient. Use of blood products discussed with patient whom consented to blood products.          Patient to be re-evaluated by DOS anesthesiologist.      Lucien Jackson MD   5/3/2020

## 2020-05-03 NOTE — PLAN OF CARE
Problem: Pain:  Goal: Pain level will decrease  Description: Pain level will decrease  Outcome: Met This Shift     Problem: Sensory:  Goal: Pain level will decrease  Description: Pain level will decrease  Outcome: Met This Shift

## 2020-05-03 NOTE — PROGRESS NOTES
05/02/2020     05/02/2020    MPV 11.1 05/02/2020     CMP:    Lab Results   Component Value Date     05/03/2020    K 3.6 05/03/2020     05/03/2020    CO2 23 05/03/2020    BUN 10 05/03/2020    CREATININE 0.7 05/03/2020    GFRAA >60 05/03/2020    LABGLOM >60 05/03/2020    GLUCOSE 100 05/03/2020    PROT 6.3 05/03/2020    LABALBU 3.3 05/03/2020    CALCIUM 8.9 05/03/2020    BILITOT 0.9 05/03/2020    ALKPHOS 101 05/03/2020    AST 26 05/03/2020     05/03/2020     Hepatic Function Panel:    Lab Results   Component Value Date    ALKPHOS 101 05/03/2020     05/03/2020    AST 26 05/03/2020    PROT 6.3 05/03/2020    BILITOT 0.9 05/03/2020    BILIDIR 0.7 05/02/2020    IBILI 0.7 05/02/2020    LABALBU 3.3 05/03/2020       PT/INR:    Lab Results   Component Value Date    PROTIME 10.3 05/01/2020    INR 0.9 05/01/2020       Assessment:     Active Problems:  · Choledocholithiasis  · Abdominal pain, epigastric and RUQ  · Gallstone pancreatitis, amylase and lipase improving  · Hepatic steatosis  · Cholecystitis   · Elevated LFTs  · Leukocytosis   · Hyperbilirubinemia   · S/P ERCP with papillotomy and stones extraction 5/1/2020 - Dilated common bile duct with multiple filling defects consistent with stones. Papillotomy was done and six medium-sized stones were removed. Excellent drainage was obtained.     Plan:     · Full liquids, advance to low fat  · NPO P MN  · Possible cholecystectomy tomorrow with Surgery   · Continue to medicate for nausea as ordered  · Monitor CMP, CBC, Radhika, Lip  · Continue IV antibiotic as ordered per General Surgery  · Continue to monitor     Discussed with Dr. Emely Aviles per Dr. Yash Ventura NP-C 5/3/2020 10:24 AM For Dr. Vick Gutierrez

## 2020-05-03 NOTE — PROGRESS NOTES
patient currently has no PCP; Will continue to follow  2. Shortness of breath-CXR normal  3. Cough- COVID negative  4. Choledocholithiasis-per GI, S/P ERCP; LFTs improving  5.   Pancreatitis-improving      Electronically signed by Lillian Angel MD on 5/3/2020 at 9:15 AM

## 2020-05-03 NOTE — PROGRESS NOTES
cholecystectomy tomorrow. The risks of the procedure were explained to the patient including but not limited to bleeding, infection, bile leak, bile duct injury, and the need for additional surgical procedures. The patient expresses understanding and consents to proceed with the surgery.     Electronically signed by Jorge Mayes DO on 5/3/20 at 12:16 PM EDT

## 2020-05-04 ENCOUNTER — APPOINTMENT (OUTPATIENT)
Dept: GENERAL RADIOLOGY | Age: 40
DRG: 263 | End: 2020-05-04
Payer: COMMERCIAL

## 2020-05-04 VITALS — DIASTOLIC BLOOD PRESSURE: 57 MMHG | TEMPERATURE: 97.2 F | OXYGEN SATURATION: 95 % | SYSTOLIC BLOOD PRESSURE: 109 MMHG

## 2020-05-04 LAB
ALBUMIN SERPL-MCNC: 3.2 G/DL (ref 3.5–5.2)
ALP BLD-CCNC: 94 U/L (ref 40–129)
ALT SERPL-CCNC: 99 U/L (ref 0–40)
AMYLASE: 73 U/L (ref 20–100)
ANION GAP SERPL CALCULATED.3IONS-SCNC: 11 MMOL/L (ref 7–16)
AST SERPL-CCNC: 16 U/L (ref 0–39)
BASOPHILS ABSOLUTE: 0.04 E9/L (ref 0–0.2)
BASOPHILS RELATIVE PERCENT: 0.3 % (ref 0–2)
BILIRUB SERPL-MCNC: 0.6 MG/DL (ref 0–1.2)
BUN BLDV-MCNC: 11 MG/DL (ref 6–20)
CALCIUM SERPL-MCNC: 8.9 MG/DL (ref 8.6–10.2)
CHLORIDE BLD-SCNC: 105 MMOL/L (ref 98–107)
CO2: 23 MMOL/L (ref 22–29)
CREAT SERPL-MCNC: 0.8 MG/DL (ref 0.7–1.2)
EOSINOPHILS ABSOLUTE: 0.4 E9/L (ref 0.05–0.5)
EOSINOPHILS RELATIVE PERCENT: 3.2 % (ref 0–6)
GFR AFRICAN AMERICAN: >60
GFR NON-AFRICAN AMERICAN: >60 ML/MIN/1.73
GLUCOSE BLD-MCNC: 95 MG/DL (ref 74–99)
HCT VFR BLD CALC: 33.6 % (ref 37–54)
HEMOGLOBIN: 11.1 G/DL (ref 12.5–16.5)
IMMATURE GRANULOCYTES #: 0.03 E9/L
IMMATURE GRANULOCYTES %: 0.2 % (ref 0–5)
LIPASE: 41 U/L (ref 13–60)
LYMPHOCYTES ABSOLUTE: 3.15 E9/L (ref 1.5–4)
LYMPHOCYTES RELATIVE PERCENT: 25.2 % (ref 20–42)
MCH RBC QN AUTO: 30.7 PG (ref 26–35)
MCHC RBC AUTO-ENTMCNC: 33 % (ref 32–34.5)
MCV RBC AUTO: 93.1 FL (ref 80–99.9)
MONOCYTES ABSOLUTE: 0.97 E9/L (ref 0.1–0.95)
MONOCYTES RELATIVE PERCENT: 7.8 % (ref 2–12)
NEUTROPHILS ABSOLUTE: 7.9 E9/L (ref 1.8–7.3)
NEUTROPHILS RELATIVE PERCENT: 63.3 % (ref 43–80)
PDW BLD-RTO: 14.3 FL (ref 11.5–15)
PLATELET # BLD: 247 E9/L (ref 130–450)
PMV BLD AUTO: 10.7 FL (ref 7–12)
POTASSIUM REFLEX MAGNESIUM: 4 MMOL/L (ref 3.5–5)
RBC # BLD: 3.61 E12/L (ref 3.8–5.8)
SODIUM BLD-SCNC: 139 MMOL/L (ref 132–146)
TOTAL PROTEIN: 6.1 G/DL (ref 6.4–8.3)
WBC # BLD: 12.5 E9/L (ref 4.5–11.5)

## 2020-05-04 PROCEDURE — 3700000000 HC ANESTHESIA ATTENDED CARE: Performed by: SURGERY

## 2020-05-04 PROCEDURE — 47563 LAPARO CHOLECYSTECTOMY/GRAPH: CPT | Performed by: SURGERY

## 2020-05-04 PROCEDURE — 7100000001 HC PACU RECOVERY - ADDTL 15 MIN: Performed by: SURGERY

## 2020-05-04 PROCEDURE — 2500000003 HC RX 250 WO HCPCS: Performed by: TRANSPLANT SURGERY

## 2020-05-04 PROCEDURE — 2720000010 HC SURG SUPPLY STERILE: Performed by: SURGERY

## 2020-05-04 PROCEDURE — 1200000000 HC SEMI PRIVATE

## 2020-05-04 PROCEDURE — 6370000000 HC RX 637 (ALT 250 FOR IP): Performed by: TRANSPLANT SURGERY

## 2020-05-04 PROCEDURE — 85025 COMPLETE CBC W/AUTO DIFF WBC: CPT

## 2020-05-04 PROCEDURE — 3600000014 HC SURGERY LEVEL 4 ADDTL 15MIN: Performed by: SURGERY

## 2020-05-04 PROCEDURE — 82150 ASSAY OF AMYLASE: CPT

## 2020-05-04 PROCEDURE — 80053 COMPREHEN METABOLIC PANEL: CPT

## 2020-05-04 PROCEDURE — 2500000003 HC RX 250 WO HCPCS: Performed by: NURSE ANESTHETIST, CERTIFIED REGISTERED

## 2020-05-04 PROCEDURE — 2500000003 HC RX 250 WO HCPCS: Performed by: SURGERY

## 2020-05-04 PROCEDURE — 3600000004 HC SURGERY LEVEL 4 BASE: Performed by: SURGERY

## 2020-05-04 PROCEDURE — 6360000004 HC RX CONTRAST MEDICATION: Performed by: SURGERY

## 2020-05-04 PROCEDURE — 36415 COLL VENOUS BLD VENIPUNCTURE: CPT

## 2020-05-04 PROCEDURE — 99232 SBSQ HOSP IP/OBS MODERATE 35: CPT | Performed by: INTERNAL MEDICINE

## 2020-05-04 PROCEDURE — 7100000000 HC PACU RECOVERY - FIRST 15 MIN: Performed by: SURGERY

## 2020-05-04 PROCEDURE — 3209999900 FLUORO FOR SURGICAL PROCEDURES

## 2020-05-04 PROCEDURE — 2500000003 HC RX 250 WO HCPCS: Performed by: INTERNAL MEDICINE

## 2020-05-04 PROCEDURE — 6360000002 HC RX W HCPCS: Performed by: TRANSPLANT SURGERY

## 2020-05-04 PROCEDURE — 3700000001 HC ADD 15 MINUTES (ANESTHESIA): Performed by: SURGERY

## 2020-05-04 PROCEDURE — 2580000003 HC RX 258

## 2020-05-04 PROCEDURE — 6360000002 HC RX W HCPCS

## 2020-05-04 PROCEDURE — 83690 ASSAY OF LIPASE: CPT

## 2020-05-04 PROCEDURE — 6360000002 HC RX W HCPCS: Performed by: NURSE ANESTHETIST, CERTIFIED REGISTERED

## 2020-05-04 PROCEDURE — 88304 TISSUE EXAM BY PATHOLOGIST: CPT

## 2020-05-04 PROCEDURE — 6370000000 HC RX 637 (ALT 250 FOR IP): Performed by: INTERNAL MEDICINE

## 2020-05-04 PROCEDURE — 2580000003 HC RX 258: Performed by: TRANSPLANT SURGERY

## 2020-05-04 PROCEDURE — 6360000002 HC RX W HCPCS: Performed by: INTERNAL MEDICINE

## 2020-05-04 PROCEDURE — 2500000003 HC RX 250 WO HCPCS

## 2020-05-04 PROCEDURE — 2709999900 HC NON-CHARGEABLE SUPPLY: Performed by: SURGERY

## 2020-05-04 RX ORDER — FENTANYL CITRATE 50 UG/ML
INJECTION, SOLUTION INTRAMUSCULAR; INTRAVENOUS PRN
Status: DISCONTINUED | OUTPATIENT
Start: 2020-05-04 | End: 2020-05-04 | Stop reason: SDUPTHER

## 2020-05-04 RX ORDER — LABETALOL HYDROCHLORIDE 5 MG/ML
INJECTION, SOLUTION INTRAVENOUS PRN
Status: DISCONTINUED | OUTPATIENT
Start: 2020-05-04 | End: 2020-05-04 | Stop reason: SDUPTHER

## 2020-05-04 RX ORDER — ONDANSETRON 2 MG/ML
INJECTION INTRAMUSCULAR; INTRAVENOUS PRN
Status: DISCONTINUED | OUTPATIENT
Start: 2020-05-04 | End: 2020-05-04 | Stop reason: SDUPTHER

## 2020-05-04 RX ORDER — MIDAZOLAM HYDROCHLORIDE 1 MG/ML
INJECTION INTRAMUSCULAR; INTRAVENOUS PRN
Status: DISCONTINUED | OUTPATIENT
Start: 2020-05-04 | End: 2020-05-04 | Stop reason: SDUPTHER

## 2020-05-04 RX ORDER — DEXAMETHASONE SODIUM PHOSPHATE 4 MG/ML
INJECTION, SOLUTION INTRA-ARTICULAR; INTRALESIONAL; INTRAMUSCULAR; INTRAVENOUS; SOFT TISSUE PRN
Status: DISCONTINUED | OUTPATIENT
Start: 2020-05-04 | End: 2020-05-04 | Stop reason: SDUPTHER

## 2020-05-04 RX ORDER — ROCURONIUM BROMIDE 10 MG/ML
INJECTION, SOLUTION INTRAVENOUS PRN
Status: DISCONTINUED | OUTPATIENT
Start: 2020-05-04 | End: 2020-05-04 | Stop reason: SDUPTHER

## 2020-05-04 RX ORDER — BUPIVACAINE HYDROCHLORIDE 5 MG/ML
INJECTION, SOLUTION EPIDURAL; INTRACAUDAL PRN
Status: DISCONTINUED | OUTPATIENT
Start: 2020-05-04 | End: 2020-05-04 | Stop reason: ALTCHOICE

## 2020-05-04 RX ORDER — SODIUM CHLORIDE, SODIUM LACTATE, POTASSIUM CHLORIDE, CALCIUM CHLORIDE 600; 310; 30; 20 MG/100ML; MG/100ML; MG/100ML; MG/100ML
INJECTION, SOLUTION INTRAVENOUS CONTINUOUS PRN
Status: DISCONTINUED | OUTPATIENT
Start: 2020-05-04 | End: 2020-05-04 | Stop reason: SDUPTHER

## 2020-05-04 RX ORDER — PROPOFOL 10 MG/ML
INJECTION, EMULSION INTRAVENOUS PRN
Status: DISCONTINUED | OUTPATIENT
Start: 2020-05-04 | End: 2020-05-04 | Stop reason: SDUPTHER

## 2020-05-04 RX ORDER — LIDOCAINE HYDROCHLORIDE 20 MG/ML
INJECTION, SOLUTION EPIDURAL; INFILTRATION; INTRACAUDAL; PERINEURAL PRN
Status: DISCONTINUED | OUTPATIENT
Start: 2020-05-04 | End: 2020-05-04 | Stop reason: SDUPTHER

## 2020-05-04 RX ORDER — FENTANYL CITRATE 50 UG/ML
25 INJECTION, SOLUTION INTRAMUSCULAR; INTRAVENOUS EVERY 5 MIN PRN
Status: DISCONTINUED | OUTPATIENT
Start: 2020-05-04 | End: 2020-05-04 | Stop reason: HOSPADM

## 2020-05-04 RX ADMIN — DEXAMETHASONE SODIUM PHOSPHATE 10 MG: 4 INJECTION, SOLUTION INTRAMUSCULAR; INTRAVENOUS at 11:51

## 2020-05-04 RX ADMIN — METRONIDAZOLE 500 MG: 500 INJECTION, SOLUTION INTRAVENOUS at 09:12

## 2020-05-04 RX ADMIN — FENTANYL CITRATE 50 MCG: 50 INJECTION, SOLUTION INTRAMUSCULAR; INTRAVENOUS at 11:59

## 2020-05-04 RX ADMIN — ROCURONIUM BROMIDE 10 MG: 10 INJECTION, SOLUTION INTRAVENOUS at 12:30

## 2020-05-04 RX ADMIN — ACETAMINOPHEN 650 MG: 325 TABLET ORAL at 22:02

## 2020-05-04 RX ADMIN — ROCURONIUM BROMIDE 50 MG: 10 INJECTION, SOLUTION INTRAVENOUS at 11:45

## 2020-05-04 RX ADMIN — CIPROFLOXACIN 400 MG: 2 INJECTION, SOLUTION INTRAVENOUS at 15:40

## 2020-05-04 RX ADMIN — ONDANSETRON HYDROCHLORIDE 4 MG: 2 INJECTION, SOLUTION INTRAMUSCULAR; INTRAVENOUS at 13:26

## 2020-05-04 RX ADMIN — ACETAMINOPHEN 650 MG: 325 TABLET ORAL at 15:40

## 2020-05-04 RX ADMIN — LABETALOL HYDROCHLORIDE 10 MG: 5 INJECTION INTRAVENOUS at 12:32

## 2020-05-04 RX ADMIN — METRONIDAZOLE 500 MG: 500 INJECTION, SOLUTION INTRAVENOUS at 00:53

## 2020-05-04 RX ADMIN — LIDOCAINE HYDROCHLORIDE 40 MG: 20 INJECTION, SOLUTION EPIDURAL; INFILTRATION; INTRACAUDAL; PERINEURAL at 11:45

## 2020-05-04 RX ADMIN — KETOROLAC TROMETHAMINE 15 MG: 30 INJECTION, SOLUTION INTRAMUSCULAR; INTRAVENOUS at 09:12

## 2020-05-04 RX ADMIN — FAMOTIDINE 20 MG: 10 INJECTION INTRAVENOUS at 22:02

## 2020-05-04 RX ADMIN — FENTANYL CITRATE 50 MCG: 50 INJECTION, SOLUTION INTRAMUSCULAR; INTRAVENOUS at 13:33

## 2020-05-04 RX ADMIN — METRONIDAZOLE 500 MG: 500 INJECTION, SOLUTION INTRAVENOUS at 17:10

## 2020-05-04 RX ADMIN — ACETAMINOPHEN 650 MG: 325 TABLET ORAL at 03:15

## 2020-05-04 RX ADMIN — MIDAZOLAM 2 MG: 1 INJECTION INTRAMUSCULAR; INTRAVENOUS at 11:36

## 2020-05-04 RX ADMIN — SUGAMMADEX 450 MG: 100 INJECTION, SOLUTION INTRAVENOUS at 13:35

## 2020-05-04 RX ADMIN — SODIUM CHLORIDE, POTASSIUM CHLORIDE, SODIUM LACTATE AND CALCIUM CHLORIDE: 600; 310; 30; 20 INJECTION, SOLUTION INTRAVENOUS at 11:36

## 2020-05-04 RX ADMIN — LABETALOL HYDROCHLORIDE 10 MG: 5 INJECTION INTRAVENOUS at 12:14

## 2020-05-04 RX ADMIN — SODIUM CHLORIDE, POTASSIUM CHLORIDE, SODIUM LACTATE AND CALCIUM CHLORIDE: 600; 310; 30; 20 INJECTION, SOLUTION INTRAVENOUS at 03:25

## 2020-05-04 RX ADMIN — PROPOFOL 250 MG: 10 INJECTION, EMULSION INTRAVENOUS at 11:45

## 2020-05-04 RX ADMIN — FAMOTIDINE 20 MG: 10 INJECTION INTRAVENOUS at 09:11

## 2020-05-04 RX ADMIN — CIPROFLOXACIN 400 MG: 2 INJECTION, SOLUTION INTRAVENOUS at 03:17

## 2020-05-04 RX ADMIN — ROCURONIUM BROMIDE 10 MG: 10 INJECTION, SOLUTION INTRAVENOUS at 12:55

## 2020-05-04 RX ADMIN — PROPOFOL 100 MG: 10 INJECTION, EMULSION INTRAVENOUS at 12:30

## 2020-05-04 RX ADMIN — FENTANYL CITRATE 50 MCG: 50 INJECTION, SOLUTION INTRAMUSCULAR; INTRAVENOUS at 11:45

## 2020-05-04 RX ADMIN — KETOROLAC TROMETHAMINE 15 MG: 30 INJECTION, SOLUTION INTRAMUSCULAR; INTRAVENOUS at 22:02

## 2020-05-04 RX ADMIN — KETOROLAC TROMETHAMINE 15 MG: 30 INJECTION, SOLUTION INTRAMUSCULAR; INTRAVENOUS at 15:40

## 2020-05-04 RX ADMIN — FENTANYL CITRATE 100 MCG: 50 INJECTION, SOLUTION INTRAMUSCULAR; INTRAVENOUS at 12:07

## 2020-05-04 RX ADMIN — FENTANYL CITRATE 50 MCG: 50 INJECTION, SOLUTION INTRAMUSCULAR; INTRAVENOUS at 13:21

## 2020-05-04 RX ADMIN — KETOROLAC TROMETHAMINE 15 MG: 30 INJECTION, SOLUTION INTRAMUSCULAR; INTRAVENOUS at 03:16

## 2020-05-04 ASSESSMENT — PAIN SCALES - GENERAL
PAINLEVEL_OUTOF10: 2
PAINLEVEL_OUTOF10: 0
PAINLEVEL_OUTOF10: 2
PAINLEVEL_OUTOF10: 4
PAINLEVEL_OUTOF10: 2
PAINLEVEL_OUTOF10: 4
PAINLEVEL_OUTOF10: 0

## 2020-05-04 ASSESSMENT — PAIN DESCRIPTION - ONSET: ONSET: PROGRESSIVE

## 2020-05-04 ASSESSMENT — PULMONARY FUNCTION TESTS
PIF_VALUE: 23
PIF_VALUE: 23
PIF_VALUE: 18
PIF_VALUE: 25
PIF_VALUE: 26
PIF_VALUE: 26
PIF_VALUE: 25
PIF_VALUE: 1
PIF_VALUE: 21
PIF_VALUE: 1
PIF_VALUE: 25
PIF_VALUE: 26
PIF_VALUE: 18
PIF_VALUE: 26
PIF_VALUE: 22
PIF_VALUE: 18
PIF_VALUE: 0
PIF_VALUE: 25
PIF_VALUE: 1
PIF_VALUE: 27
PIF_VALUE: 25
PIF_VALUE: 22
PIF_VALUE: 25
PIF_VALUE: 25
PIF_VALUE: 22
PIF_VALUE: 25
PIF_VALUE: 25
PIF_VALUE: 20
PIF_VALUE: 20
PIF_VALUE: 19
PIF_VALUE: 25
PIF_VALUE: 21
PIF_VALUE: 24
PIF_VALUE: 22
PIF_VALUE: 25
PIF_VALUE: 25
PIF_VALUE: 26
PIF_VALUE: 24
PIF_VALUE: 20
PIF_VALUE: 25
PIF_VALUE: 23
PIF_VALUE: 20
PIF_VALUE: 13
PIF_VALUE: 18
PIF_VALUE: 2
PIF_VALUE: 31
PIF_VALUE: 25
PIF_VALUE: 26
PIF_VALUE: 25
PIF_VALUE: 19
PIF_VALUE: 20
PIF_VALUE: 0
PIF_VALUE: 26
PIF_VALUE: 26
PIF_VALUE: 29
PIF_VALUE: 24
PIF_VALUE: 26
PIF_VALUE: 27
PIF_VALUE: 0
PIF_VALUE: 26
PIF_VALUE: 26
PIF_VALUE: 25
PIF_VALUE: 25
PIF_VALUE: 22
PIF_VALUE: 1
PIF_VALUE: 26
PIF_VALUE: 1
PIF_VALUE: 26
PIF_VALUE: 18
PIF_VALUE: 25
PIF_VALUE: 1
PIF_VALUE: 25
PIF_VALUE: 21
PIF_VALUE: 21
PIF_VALUE: 10
PIF_VALUE: 23
PIF_VALUE: 25
PIF_VALUE: 20
PIF_VALUE: 4
PIF_VALUE: 26
PIF_VALUE: 13
PIF_VALUE: 0
PIF_VALUE: 25
PIF_VALUE: 25
PIF_VALUE: 22
PIF_VALUE: 26
PIF_VALUE: 1
PIF_VALUE: 19
PIF_VALUE: 19
PIF_VALUE: 22
PIF_VALUE: 23
PIF_VALUE: 22
PIF_VALUE: 23
PIF_VALUE: 26
PIF_VALUE: 25
PIF_VALUE: 1
PIF_VALUE: 22
PIF_VALUE: 27
PIF_VALUE: 25
PIF_VALUE: 4
PIF_VALUE: 23
PIF_VALUE: 25
PIF_VALUE: 26
PIF_VALUE: 26
PIF_VALUE: 25
PIF_VALUE: 22
PIF_VALUE: 25
PIF_VALUE: 25
PIF_VALUE: 26
PIF_VALUE: 26
PIF_VALUE: 18
PIF_VALUE: 26
PIF_VALUE: 18
PIF_VALUE: 1
PIF_VALUE: 23
PIF_VALUE: 17
PIF_VALUE: 26
PIF_VALUE: 25

## 2020-05-04 ASSESSMENT — PAIN DESCRIPTION - PAIN TYPE
TYPE: ACUTE PAIN
TYPE: SURGICAL PAIN
TYPE: SURGICAL PAIN

## 2020-05-04 ASSESSMENT — PAIN DESCRIPTION - LOCATION
LOCATION: ABDOMEN;INCISION
LOCATION: ABDOMEN;INCISION
LOCATION: ABDOMEN

## 2020-05-04 ASSESSMENT — LIFESTYLE VARIABLES: SMOKING_STATUS: 1

## 2020-05-04 ASSESSMENT — PAIN DESCRIPTION - DESCRIPTORS: DESCRIPTORS: SORE

## 2020-05-04 NOTE — PROGRESS NOTES
nerve deficit,  speech normal      Recent Labs     05/02/20  0635 05/03/20  0435 05/04/20  0326    140 139   K 3.6 3.6 4.0    107 105   CO2 23 23 23   BUN 9 10 11   CREATININE 0.7 0.7 0.8   GLUCOSE 110* 100* 95   CALCIUM 8.9 8.9 8.9       Recent Labs     05/02/20  0635 05/03/20  1245 05/04/20  0326   WBC 16.8* 13.2* 12.5*   RBC 4.03 3.69* 3.61*   HGB 12.3* 11.3* 11.1*   HCT 36.4* 33.9* 33.6*   MCV 90.3 91.9 93.1   MCH 30.5 30.6 30.7   MCHC 33.8 33.3 33.0   RDW 14.6 14.5 14.3    235 247   MPV 11.1 11.1 10.7       Labs and images reviewed     Radiology:   XR CHEST PORTABLE   Final Result      NO ACUTE CARDIOPULMONARY PROCESS         FL ERCP BILIARY AND PANCREATIC S&I   Final Result   Cholangiogram demonstrates filling defects likely calculi or air   bubbles with a balloon inflated in the common duct. The exam has been dictated and signed by Raissa Hebert. Tamela Wyatt APRN-CNP   and Caryle Gruber MD, reviewed and concurred with these findings. MRI ABDOMEN W WO CONTRAST MRCP   Final Result      1. Positive for cholelithiasis and choledocholithiasis. 2. Positive for cholecystitis and pancreatitis. NOTE: This is an abnormal report. US ABDOMEN LIMITED   Final Result      Cholelithiasis with gallbladder wall thickening and slight dilation of   the common bile duct. Hepatic steatosis. CT ABDOMEN PELVIS W IV CONTRAST Additional Contrast? None   Final Result      There is no evidence to suggest acute process on CT of abdomen and   pelvis. Hepatic steatosis. Bilateral pars defects L5-S1 with grade 1 anterolisthesis of L5.             XR CHEST PORTABLE   Final Result   Limited study, due to a poor inspiratory effort, no gross   abnormality                     FLUORO FOR SURGICAL PROCEDURES    (Results Pending)       Assessment:    Active Problems:    Chronic cholecystitis due to cholelithiasis with choledocholithiasis    Cholecystitis  Resolved Problems:    * No resolved hospital problems. *      Plan:    Chest pressure - EKG no acute changes ,trops neg ,has family hx ,if sxs reoccur will consider T ,will recommend out pt follow up. Continue to monitor. SOB  - CXR normal ,continue to monitor. Cough - covid neg ,    Choledocholithiasis - s/p ERCP ,LFts improving ,for lap sin today     Acute GS pancreatitis - improving.       Electronically signed by Yessi Sterling MD on 5/4/2020 at 9:34 AM

## 2020-05-04 NOTE — PROGRESS NOTES
[facility] Quality Flow/Interdisciplinary Rounds Progress Note        Quality Flow Rounds held on May 4, 2020    Disciplines Attending:  Bedside Nurse, ,  and Nursing Unit Leadership    Genny Quevedo was admitted on 4/30/2020  2:10 PM    Anticipated Discharge Date:  Expected Discharge Date: 05/04/20    Disposition:    Melecio Score:  Melecio Scale Score: 21    Readmission Risk              Risk of Unplanned Readmission:        7           Discussed patient goal for the day, patient clinical progression, and barriers to discharge.   The following Goal(s) of the Day/Commitment(s) have been identified:  for shea/sin nga Sanchez  May 4, 2020

## 2020-05-04 NOTE — CARE COORDINATION
Chart reviewed. OR for Kasey Gordon today. Discharge plan remains home pending clinical progress. Case management and social work will continue to follow to assist with the transition of care.

## 2020-05-04 NOTE — OP NOTE
[id number] [address]                                OPERATIVE REPORT    PATIENT NAME: Cheryl Tiwari                   :        1980  MED REC NO:   [id number]                            ROOM:       0530  ACCOUNT NO:   [de-identified]                           ADMIT DATE: 2020  PROVIDER:     Elidia Chanel MD    DATE OF PROCEDURE:  2020    OPERATION PERFORMED:  ERCP with papillotomy and stones extraction. PREOPERATIVE DIAGNOSES:  Severe acute pancreatitis with elevated liver  enzymes consistent with gallstone pancreatitis. POSTOPERATIVE DIAGNOSES:  Dilated common bile duct with multiple filling  defects consistent with stones. Papillotomy was done and six  medium-sized stones were removed. Excellent drainage was obtained. ANESTHESIA:  LMAC. DESCRIPTION OF PROCEDURE:  With the patient in his left lateral  decubitus position, the Olympus side-viewing video duodenoscope was  introduced into the esophagus, advanced through the GE junction into the  gastric body, advanced through the pylorus and duodenal bulb, second  portion of the duodenum where papilla was visualized and positioned at  12 o'clock position. Common bile duct was readily cannulated using the  papillotome. Deep cannulation was obtained via guidewire over which the  papillotome was advanced and cholangiogram showed dilated common bile  duct with multiple filling defects consistent with possible stones. Over the guidewire, the papillotome was then adjusted and positioned and  an adequately sized papillotomy was performed. Over the guidewire, the  papillotome was then exchanged with #9 to #12-Sinhala balloon and balloon  sweeping resulted in removal of six medium-sized stones with repeated  sweeping being performed. Excellent drainage was obtained.   The scope  was retrieved, the area was decompressed, multiple pictures,
was then used to dissect the gallbladder off the gallbladder fossa. Some bleeding from the edge was noted and clipped. After freeing up the gallbladder from its attachments, it was placed in a laparoscopic bag and removed through the 10-mm port site. The suction- to irrigate the gallbladder fossa and inspect the clip sites. Hemostasis was observed. Clips remained in place. The fascia at the 12 mm port site was closed with an interrupted Vicryl suture using the Jayson-Nahid device. The abdomen was decompressed and the remaining ports were removed. The skin was then closed with 4-0 Monocryl inverted interrupted dermal sutures. The port sites were infiltrated with local anesthetic. The incisions were cleaned and dried, and Dermaflex was applied. Needle, sponge, and instrument counts were reported as correct x2. The patient tolerated the procedure well without complications. Dr. Rolando Esparza was present and scrubbed throughout the case. DISPOSITION: Anesthesia was discontinued and the patient was extubated prior to leaving the OR. He was transferred to the recovery area in good condition. He is to be sent to the floor in stable condition    Electronically signed by Garrick Garg DO on 5/4/2020 at 3:52 PM  Attestation:    I was present during the procedure and participated in all walsh aspects.     Dax Rangel DO  05/07/20  9:17 AM

## 2020-05-04 NOTE — ANESTHESIA PRE PROCEDURE
142/79   09/14/16 125/66       NPO Status: Time of last liquid consumption: 2300                        Time of last solid consumption: 2300                        Solids 5/3/20 @ 1700    Liquids 5/3/20 @ 2330  BMI:   Wt Readings from Last 3 Encounters:   04/30/20 255 lb (115.7 kg)   09/14/16 215 lb (97.5 kg)     Body mass index is 35.57 kg/m². CBC:   Lab Results   Component Value Date    WBC 12.5 05/04/2020    RBC 3.61 05/04/2020    RBC 4.99 04/12/2017    HGB 11.1 05/04/2020    HCT 33.6 05/04/2020    MCV 93.1 05/04/2020    RDW 14.3 05/04/2020     05/04/2020       CMP:   Lab Results   Component Value Date     05/04/2020    K 4.0 05/04/2020     05/04/2020    CO2 23 05/04/2020    BUN 11 05/04/2020    CREATININE 0.8 05/04/2020    GFRAA >60 05/04/2020    LABGLOM >60 05/04/2020    GLUCOSE 95 05/04/2020    PROT 6.1 05/04/2020    CALCIUM 8.9 05/04/2020    BILITOT 0.6 05/04/2020    ALKPHOS 94 05/04/2020    AST 16 05/04/2020    ALT 99 05/04/2020       POC Tests: No results for input(s): POCGLU, POCNA, POCK, POCCL, POCBUN, POCHEMO, POCHCT in the last 72 hours.     Coags:   Lab Results   Component Value Date    PROTIME 10.3 05/01/2020    INR 0.9 05/01/2020    APTT 31.7 05/01/2020       HCG (If Applicable): No results found for: PREGTESTUR, PREGSERUM, HCG, HCGQUANT     ABGs: No results found for: PHART, PO2ART, FCE6JCN, QXK0NMJ, BEART, K2GJSPLY     Type & Screen (If Applicable):  No results found for: LABABO, [address]    Anesthesia Evaluation  Patient summary reviewed and Nursing notes reviewed no history of anesthetic complications:   Airway: Mallampati: III  TM distance: >3 FB   Neck ROM: full  Mouth opening: > = 3 FB Dental: normal exam         Pulmonary: breath sounds clear to auscultation  (+) asthma ( ): current smoker                          ROS comment: CXR 5/2/2020:  indings:     The lungs are clear.  There is no evidence of pulmonary infiltrate or  pleural effusion.  The pulmonary vascularity is noted above). NPO status confirmed. Anesthetic plan, risks, benefits, alternatives discussed with patient. Patient verbalized an understanding and agrees to proceed.      Mayelin Lyle MD  Staff Anesthesiologist  11:34 AM

## 2020-05-05 VITALS
HEART RATE: 63 BPM | BODY MASS INDEX: 35.7 KG/M2 | DIASTOLIC BLOOD PRESSURE: 85 MMHG | OXYGEN SATURATION: 97 % | RESPIRATION RATE: 16 BRPM | HEIGHT: 71 IN | WEIGHT: 255 LBS | TEMPERATURE: 98.3 F | SYSTOLIC BLOOD PRESSURE: 133 MMHG

## 2020-05-05 LAB
ALBUMIN SERPL-MCNC: 2.8 G/DL (ref 3.5–5.2)
ALP BLD-CCNC: 74 U/L (ref 40–129)
ALT SERPL-CCNC: 85 U/L (ref 0–40)
AMYLASE: 40 U/L (ref 20–100)
ANION GAP SERPL CALCULATED.3IONS-SCNC: 11 MMOL/L (ref 7–16)
AST SERPL-CCNC: 26 U/L (ref 0–39)
BASOPHILS ABSOLUTE: 0.03 E9/L (ref 0–0.2)
BASOPHILS RELATIVE PERCENT: 0.2 % (ref 0–2)
BILIRUB SERPL-MCNC: 0.4 MG/DL (ref 0–1.2)
BILIRUBIN DIRECT: 0.3 MG/DL (ref 0–0.3)
BILIRUBIN, INDIRECT: 0.1 MG/DL (ref 0–1)
BUN BLDV-MCNC: 11 MG/DL (ref 6–20)
CALCIUM SERPL-MCNC: 8.6 MG/DL (ref 8.6–10.2)
CHLORIDE BLD-SCNC: 108 MMOL/L (ref 98–107)
CO2: 21 MMOL/L (ref 22–29)
CREAT SERPL-MCNC: 0.8 MG/DL (ref 0.7–1.2)
EOSINOPHILS ABSOLUTE: 0.02 E9/L (ref 0.05–0.5)
EOSINOPHILS RELATIVE PERCENT: 0.2 % (ref 0–6)
GFR AFRICAN AMERICAN: >60
GFR NON-AFRICAN AMERICAN: >60 ML/MIN/1.73
GLUCOSE BLD-MCNC: 135 MG/DL (ref 74–99)
HCT VFR BLD CALC: 30.8 % (ref 37–54)
HEMOGLOBIN: 9.9 G/DL (ref 12.5–16.5)
IMMATURE GRANULOCYTES #: 0.07 E9/L
IMMATURE GRANULOCYTES %: 0.5 % (ref 0–5)
LIPASE: 22 U/L (ref 13–60)
LYMPHOCYTES ABSOLUTE: 2.57 E9/L (ref 1.5–4)
LYMPHOCYTES RELATIVE PERCENT: 19.9 % (ref 20–42)
MCH RBC QN AUTO: 30.2 PG (ref 26–35)
MCHC RBC AUTO-ENTMCNC: 32.1 % (ref 32–34.5)
MCV RBC AUTO: 93.9 FL (ref 80–99.9)
MONOCYTES ABSOLUTE: 0.77 E9/L (ref 0.1–0.95)
MONOCYTES RELATIVE PERCENT: 6 % (ref 2–12)
NEUTROPHILS ABSOLUTE: 9.47 E9/L (ref 1.8–7.3)
NEUTROPHILS RELATIVE PERCENT: 73.2 % (ref 43–80)
PDW BLD-RTO: 14.3 FL (ref 11.5–15)
PLATELET # BLD: 297 E9/L (ref 130–450)
PMV BLD AUTO: 11.1 FL (ref 7–12)
POTASSIUM REFLEX MAGNESIUM: 4 MMOL/L (ref 3.5–5)
POTASSIUM SERPL-SCNC: 4 MMOL/L (ref 3.5–5)
RBC # BLD: 3.28 E12/L (ref 3.8–5.8)
SODIUM BLD-SCNC: 140 MMOL/L (ref 132–146)
TOTAL PROTEIN: 6 G/DL (ref 6.4–8.3)
WBC # BLD: 12.9 E9/L (ref 4.5–11.5)

## 2020-05-05 PROCEDURE — 82248 BILIRUBIN DIRECT: CPT

## 2020-05-05 PROCEDURE — 36415 COLL VENOUS BLD VENIPUNCTURE: CPT

## 2020-05-05 PROCEDURE — 83690 ASSAY OF LIPASE: CPT

## 2020-05-05 PROCEDURE — 6370000000 HC RX 637 (ALT 250 FOR IP): Performed by: INTERNAL MEDICINE

## 2020-05-05 PROCEDURE — 80053 COMPREHEN METABOLIC PANEL: CPT

## 2020-05-05 PROCEDURE — 6360000002 HC RX W HCPCS: Performed by: INTERNAL MEDICINE

## 2020-05-05 PROCEDURE — 85025 COMPLETE CBC W/AUTO DIFF WBC: CPT

## 2020-05-05 PROCEDURE — 2580000003 HC RX 258: Performed by: INTERNAL MEDICINE

## 2020-05-05 PROCEDURE — 2500000003 HC RX 250 WO HCPCS: Performed by: INTERNAL MEDICINE

## 2020-05-05 PROCEDURE — 82150 ASSAY OF AMYLASE: CPT

## 2020-05-05 RX ORDER — OXYCODONE HYDROCHLORIDE AND ACETAMINOPHEN 5; 325 MG/1; MG/1
1 TABLET ORAL EVERY 6 HOURS PRN
Qty: 21 TABLET | Refills: 0 | Status: SHIPPED | OUTPATIENT
Start: 2020-05-05 | End: 2020-05-12

## 2020-05-05 RX ADMIN — ACETAMINOPHEN 650 MG: 325 TABLET ORAL at 04:07

## 2020-05-05 RX ADMIN — FAMOTIDINE 20 MG: 10 INJECTION INTRAVENOUS at 08:05

## 2020-05-05 RX ADMIN — ACETAMINOPHEN 650 MG: 325 TABLET ORAL at 08:08

## 2020-05-05 RX ADMIN — METRONIDAZOLE 500 MG: 500 INJECTION, SOLUTION INTRAVENOUS at 08:04

## 2020-05-05 RX ADMIN — HYDROMORPHONE HYDROCHLORIDE 0.25 MG: 1 INJECTION, SOLUTION INTRAMUSCULAR; INTRAVENOUS; SUBCUTANEOUS at 01:23

## 2020-05-05 RX ADMIN — CIPROFLOXACIN 400 MG: 2 INJECTION, SOLUTION INTRAVENOUS at 04:07

## 2020-05-05 RX ADMIN — METRONIDAZOLE 500 MG: 500 INJECTION, SOLUTION INTRAVENOUS at 01:15

## 2020-05-05 RX ADMIN — Medication 10 ML: at 08:09

## 2020-05-05 RX ADMIN — KETOROLAC TROMETHAMINE 15 MG: 30 INJECTION, SOLUTION INTRAMUSCULAR; INTRAVENOUS at 08:07

## 2020-05-05 RX ADMIN — ENOXAPARIN SODIUM 40 MG: 40 INJECTION SUBCUTANEOUS at 08:08

## 2020-05-05 RX ADMIN — KETOROLAC TROMETHAMINE 15 MG: 30 INJECTION, SOLUTION INTRAMUSCULAR; INTRAVENOUS at 04:07

## 2020-05-05 ASSESSMENT — PAIN SCALES - GENERAL
PAINLEVEL_OUTOF10: 0
PAINLEVEL_OUTOF10: 4
PAINLEVEL_OUTOF10: 5

## 2020-05-05 NOTE — PROGRESS NOTES
CLINICAL PHARMACY NOTE: MEDS TO [address] Select Patient?: No  Total # of Prescriptions Filled: 1   The following medications were delivered to the patient:  · Percocet 5/ 325 mg  Total # of Interventions Completed: 2  Time Spent (min): 30    Additional Documentation:

## 2020-05-05 NOTE — ADT AUTH CERT
Utilization Reviews         Gallbladder or Bile Duct Inflammation or Stone - Care Day 5 (5/4/2020) by Vcik Heard RN         Review Status Review Entered   Completed 5/5/2020 08:27       Criteria Review      Care Day: 5 Care Date: 5/4/2020 Level of Care: Inpatient Floor    Guideline Day 3    Clinical Status    (X) * Hemodynamic stability    (X) * Afebrile or temperature acceptable for next level of care    (X) * Nausea and vomiting absent or controlled and acceptable for next level of care    (X) * Pain absent or managed    (X) * Laboratory test results normal or acceptable for next level of care    ( ) * Discharge plans and education understood    Activity    ( ) * Ambulatory [G]    Routes    (X) * Oral hydration, medications, and diet    Medications    (X) * Antibiotics absent or administration arranged for next level of care    * Milestone   Additional Notes   5/4   on Med surg unit      /71   Pulse 66   Temp 97.8 °F (36.6 °C)   Resp 16   Ht 5' 11\" (1.803 m)   Wt 255 lb (115.7 kg)   SpO2 97%       Meds-   sodium chloride flush 10 mL Intravenous 2 times per day   · enoxaparin 40 mg Subcutaneous Daily   · ciprofloxacin 400 mg Intravenous Q12H   · metroNIDAZOLE 500 mg Intravenous Q8H   · acetaminophen 650 mg Oral Q6H   · famotidine (PEPCID) injection 20 mg Intravenous BID   · ketorolac 15 mg Intravenous Q6H   · LR @ 150 ml/hr      Surgeon  note-   Subjective:   Patient states he is doing ok. Denies any fever, chills, nausea, or vomiting.  He is NPO.        Objective:   /71   Pulse 66   Temp 97.8 °F (36.6 °C)   Resp 16   Ht 5' 11\" (1.803 m)   Wt 255 lb (115.7 kg)   SpO2 97%   BMI 35.57 kg/m²    GENERAL:  Laying in bed, no apparent distress   LUNGS:  No increased work of breathing, no cyanosis   CARDIOVASCULAR:  Extremities warm and well perfused,    ABDOMEN:  Soft, mildly tender   SKIN: Warm and dry   Assessment/Plan:   36 y.o. male with gallstone pancreatitis and choledocholithiasis s/p ERCP with stone extraction 5/1/20.        - WBC improving slightly   - continue Cipro and flagyl IV for antibiotics   - OR today for laparoscopic cholecystectomy   - continue monitoring vitals   - GI following      Internal MD note-   Assessment:       Active Problems:     Chronic cholecystitis due to cholelithiasis with choledocholithiasis     Cholecystitis   Resolved Problems:     * No resolved hospital problems. *   Plan:   Chest pressure - EKG no acute changes ,trops neg ,has family hx ,if sxs reoccur will consider T ,will recommend out pt follow up. Continue to monitor. SOB  - CXR normal ,continue to monitor.    Cough - covid neg ,   Choledocholithiasis - s/p ERCP ,LFts improving ,for lap sin today    Acute GS pancreatitis - improving      OP note-   Pre-Op Diagnosis: cholelithiasis        Post-Op Diagnosis: Same   Procedure(s):   CHOLECYSTECTOMY LAPAROSCOPIC WITH CHOLANGIOGRAM   Surgeon(s):   Lance Martin DO   Assistant:    Resident: Karthik Bray DO   Anesthesia: General   Estimated Blood Loss (mL): less than 50    Complications: None       Gallbladder or Bile Duct Inflammation or Stone - Care Day 4 (5/3/2020) by Maria Del Carmen Maier RN         Review Status Review Entered   Completed 5/4/2020 15:15       Criteria Review      Care Day: 4 Care Date: 5/3/2020 Level of Care: Inpatient Floor    Guideline Day 3    Clinical Status    (X) * Hemodynamic stability    (X) * Afebrile or temperature acceptable for next level of care    (X) * Nausea and vomiting absent or controlled and acceptable for next level of care    (X) * Pain absent or managed    (X) * Laboratory test results normal or acceptable for next level of care    ( ) * Discharge plans and education understood    Activity    ( ) * Ambulatory [G]    Routes    (X) * Oral hydration, medications, and diet    Medications    (X) * Antibiotics absent or administration arranged for next level of care    * Milestone   Additional Notes   5/3         BP will need to set up with a family doctor -as patient currently has no PCP. 2.  Shortness of breath-check CXR   3.  Cough-check COVID status   4.  Choledocholithiasis-per GI, S/P ERCP   5.  Pancreatitis-improving         GI note-   Assessment:   Active Problems:   · Choledocholithiasis   · Abdominal pain, epigastric and RUQ   · Gallstone pancreatitis, amylase and lipase improving   · Hepatic steatosis   · Cholecystitis    · Elevated LFTs   · Leukocytosis    · Hyperbilirubinemia    · S/P ERCP with papillotomy and stones extraction 5/1/2020 - Dilated common bile duct with multiple filling defects consistent with stones.  Papillotomy was done and six medium-sized stones were removed.  Excellent drainage was obtained. Plan:   · Clear liquid diet, DO NOT ADVANCE   · Continue IV LR as ordered   · Continue to medicate for pain and nausea as ordered   · Monitor CMP, CBC, Radhika, Lip   · Continue IV antibiotic as ordered per General Surgery   · Continue to monitor       Surgeon note-   Assessment/Plan:   3 42-year-old male with gallstone pancreatitis, choledocholithiasis.  Continue with clear liquid diet.  Await improvement of pancreatitis, potential cholecystectomy on Monday if continues to clinically improve.

## 2020-05-06 ENCOUNTER — TELEPHONE (OUTPATIENT)
Dept: SURGERY | Age: 40
End: 2020-05-06

## 2020-05-20 ENCOUNTER — OFFICE VISIT (OUTPATIENT)
Dept: SURGERY | Age: 40
End: 2020-05-20

## 2020-05-20 VITALS
SYSTOLIC BLOOD PRESSURE: 151 MMHG | TEMPERATURE: 97.8 F | OXYGEN SATURATION: 96 % | HEIGHT: 71 IN | RESPIRATION RATE: 16 BRPM | BODY MASS INDEX: 34.41 KG/M2 | DIASTOLIC BLOOD PRESSURE: 92 MMHG | HEART RATE: 84 BPM | WEIGHT: 245.8 LBS

## 2020-05-20 PROCEDURE — 99024 POSTOP FOLLOW-UP VISIT: CPT | Performed by: SURGERY

## (undated) DEVICE — TOWEL,OR,DSP,ST,BLUE,STD,6/PK,12PK/CS: Brand: MEDLINE

## (undated) DEVICE — PUMP SUC IRR TBNG L10FT W/ HNDPC ASSEMB STRYKEFLOW 2

## (undated) DEVICE — BLADE CLIPPER GEN PURP NS

## (undated) DEVICE — APPLIER CLP M/L SHFT DIA5MM 15 LIG LIGAMAX 5

## (undated) DEVICE — INTENDED FOR TISSUE SEPARATION, AND OTHER PROCEDURES THAT REQUIRE A SHARP SURGICAL BLADE TO PUNCTURE OR CUT.: Brand: BARD-PARKER ® STAINLESS STEEL BLADES

## (undated) DEVICE — SYRINGE 20ML LL S/C 50

## (undated) DEVICE — DOUBLE BASIN SET: Brand: MEDLINE INDUSTRIES, INC.

## (undated) DEVICE — NEEDLE HYPO 25GA L1.5IN BLU POLYPR HUB S STL REG BVL STR

## (undated) DEVICE — NEEDLE CLOSURE OMNICLOSE

## (undated) DEVICE — PLUMEPORT LAPAROSCOPIC SMOKE FILTRATION DEVICE: Brand: PLUMEPORT ACTIV

## (undated) DEVICE — TROCAR: Brand: KII FIOS FIRST ENTRY

## (undated) DEVICE — RETRIEVAL BALLOON CATHETER: Brand: EXTRACTOR™ PRO RX

## (undated) DEVICE — KIT,ANTI FOG,W/SPONGE & FLUID,SOFT PACK: Brand: MEDLINE

## (undated) DEVICE — SOLUTION IV IRRIG POUR BRL 0.9% SODIUM CHL 2F7124

## (undated) DEVICE — SYSTEM BX CAP BILI RAP EXCHG CAP LOK DEV COMPATIBLE W/ OLY

## (undated) DEVICE — GOWN,SIRUS,FABRNF,XL,20/CS: Brand: MEDLINE

## (undated) DEVICE — APPLICATOR MEDICATED 26 CC SOLUTION HI LT ORNG CHLORAPREP

## (undated) DEVICE — SPHINCTEROTOME: Brand: DREAMTOME™ RX 44

## (undated) DEVICE — 4-PORT MANIFOLD: Brand: NEPTUNE 2

## (undated) DEVICE — ELECTRODE ES 36CM LAP FLAT L HK COAT DISP CLEANCOAT

## (undated) DEVICE — TROCAR: Brand: KII® SLEEVE

## (undated) DEVICE — INSUFFLATION TUBING SET WITH FILTER, FUNNEL CONNECTOR AND LUER LOCK: Brand: JOSNOE MEDICAL INC

## (undated) DEVICE — NEEDLE,25GX1.5",REG,BEVEL: Brand: MEDLINE

## (undated) DEVICE — SCISSORS ENDOSCP DIA5MM CRV MPLR CAUT W/ RATCH HNDL

## (undated) DEVICE — DRAPE,CHEST,FENES,15X10,STERIL: Brand: MEDLINE

## (undated) DEVICE — INSUFFLATION NEEDLE TO ESTABLISH PNEUMOPERITONEUM.: Brand: INSUFFLATION NEEDLE

## (undated) DEVICE — MEDIA CONTRAST ISOVUE  300 10X50ML

## (undated) DEVICE — PACK PROCEDURE SURG GEN CUST

## (undated) DEVICE — APPLIER CLP L SHFT DIA12MM 20 ROT MULT LIGACLP

## (undated) DEVICE — Z INACTIVE USE 2660664 SOLUTION IRRIG 3000ML 0.9% SOD CHL USP UROMATIC PLAS CONT

## (undated) DEVICE — CATH CHOLANGIO 19GA PRE-VIEW KUMAR

## (undated) DEVICE — BLOCK BITE 60FR RUBBER ADLT DENTAL

## (undated) DEVICE — COVER HNDL LT DISP

## (undated) DEVICE — CLIP APPL INT 5 MMX35 CM ENDOSCP AUTO HEMOLOCK TI

## (undated) DEVICE — BAG SPEC REM 224ML W4XL6IN DIA10MM 1 HND GYN DISP ENDOPCH

## (undated) DEVICE — DRAPE C ARM W41XL74IN UNIV MOB W RUBBERBAND CLP

## (undated) DEVICE — ELECTRODE PT RET AD L9FT HI MOIST COND ADH HYDRGEL CORDED

## (undated) DEVICE — GRADUATE TRIANG MEASURE 1000ML BLK PRNT

## (undated) DEVICE — CATHETER CHOLGM LAPSCP 5 MMX32 CM

## (undated) DEVICE — ADHESIVE SKIN CLSR 0.7ML TOP DERMBND ADV